# Patient Record
Sex: MALE | Race: WHITE | NOT HISPANIC OR LATINO | Employment: FULL TIME | URBAN - METROPOLITAN AREA
[De-identification: names, ages, dates, MRNs, and addresses within clinical notes are randomized per-mention and may not be internally consistent; named-entity substitution may affect disease eponyms.]

---

## 2020-09-16 ENCOUNTER — OFFICE VISIT - HEALTHEAST (OUTPATIENT)
Dept: FAMILY MEDICINE | Facility: CLINIC | Age: 35
End: 2020-09-16

## 2020-09-16 ENCOUNTER — COMMUNICATION - HEALTHEAST (OUTPATIENT)
Dept: TELEHEALTH | Facility: CLINIC | Age: 35
End: 2020-09-16

## 2020-09-16 DIAGNOSIS — K21.9 GASTROESOPHAGEAL REFLUX DISEASE WITHOUT ESOPHAGITIS: ICD-10-CM

## 2020-09-16 DIAGNOSIS — Z00.00 ENCOUNTER FOR GENERAL ADULT MEDICAL EXAMINATION WITHOUT ABNORMAL FINDINGS: ICD-10-CM

## 2020-09-16 DIAGNOSIS — E78.5 HYPERLIPIDEMIA, UNSPECIFIED HYPERLIPIDEMIA TYPE: ICD-10-CM

## 2020-09-16 DIAGNOSIS — B36.0 TINEA VERSICOLOR: ICD-10-CM

## 2020-09-16 ASSESSMENT — MIFFLIN-ST. JEOR: SCORE: 1806.49

## 2020-12-15 ENCOUNTER — AMBULATORY - HEALTHEAST (OUTPATIENT)
Dept: NURSING | Facility: CLINIC | Age: 35
End: 2020-12-15

## 2021-06-05 VITALS
SYSTOLIC BLOOD PRESSURE: 118 MMHG | HEIGHT: 71 IN | DIASTOLIC BLOOD PRESSURE: 78 MMHG | OXYGEN SATURATION: 98 % | TEMPERATURE: 97.9 F | WEIGHT: 189 LBS | HEART RATE: 66 BPM | BODY MASS INDEX: 26.46 KG/M2

## 2021-06-11 NOTE — PROGRESS NOTES
Assessment:      Healthy male exam.      1. Encounter for general adult medical examination without abnormal findings  Encouraged the patient on his weight and trying to eat healthier and find ways to exercise more.  At age 35 it is good to have a baseline set of lipids which were elevated 2 years ago but since he was very reluctant to get blood work done today I told him it was fine to wait another year or 2 but then certainly would want to recheck his lipids.  I did suggest to him that likely his lipids are going to be better now since he has made some positive changes.    2. Hyperlipidemia, unspecified hyperlipidemia type  2018: 215/140/37/178    3. Tinea versicolor  Treat with over-the-counter Lamisil daily for 2 to 4 weeks    4.  Gastroesophageal reflux disease  Continue with omeprazole 20 mg every morning    I did talk to him about the telangiectasia on his forehead and that certainly can be treated with electrocautery or laser but that the scar may be more noticeable than current telangiectasia.         Plan:       All questions answered.  Diagnosis explained in detail, including differential.  Discussed healthy lifestyle modifications.  The patient has a significant fear and anxiety about having blood draws and getting shots and states that 2 years ago when he had blood drawn he passed out.  Unless absolutely necessary would like to wait on doing blood work today.    Follow-up 1 year for preventative healthcare visit    Subjective:      Neville Neil is a 35 y.o. male who presents for an annual exam. The patient reports that there is not domestic violence in his life.      Healthy Habits:   Regular Exercise: No  Sunscreen Use: Yes  Healthy Diet: Yes  Dental Visits Regularly: Yes  Seat Belt: No  Sexually active: Yes  Monthly Self Testicular Exams:  No  Hemoccults: No  Flex Sig: No  Colonoscopy: No  Lipid Profile: Yes  Glucose Screen: Yes  Prevention of Osteoporosis: Yes  Last Dexa: No  Guns at Home:   No      Immunization History   Administered Date(s) Administered     INFLUENZA,SEASONAL QUAD, PF, =/> 6months 12/03/2019     Tdap 02/23/2010, 11/28/2017     Typhoid, Inj, Inactive 01/02/2008     Immunization status: up to date and documented, Refuses Immunization influenza vaccine.    Current Outpatient Medications   Medication Sig Dispense Refill     omeprazole (PRILOSEC) 20 MG capsule Take 20 mg by mouth.       No current facility-administered medications for this visit.      No past medical history on file.  No past surgical history on file.  Azithromycin  Family History   Problem Relation Age of Onset     Hyperlipidemia Mother      Hypertension Mother      Prostate cancer Father 74     Prostate cancer Paternal Grandfather      Social History     Socioeconomic History     Marital status:      Spouse name: Not on file     Number of children: Not on file     Years of education: Not on file     Highest education level: Not on file   Occupational History     Not on file   Social Needs     Financial resource strain: Not on file     Food insecurity     Worry: Not on file     Inability: Not on file     Transportation needs     Medical: Not on file     Non-medical: Not on file   Tobacco Use     Smoking status: Never Smoker     Smokeless tobacco: Never Used   Substance and Sexual Activity     Alcohol use: Yes     Alcohol/week: 1.0 standard drinks     Types: 1 Cans of beer per week     Drinks per session: 1 or 2     Drug use: Never     Sexual activity: Yes     Partners: Female   Lifestyle     Physical activity     Days per week: Not on file     Minutes per session: Not on file     Stress: Not on file   Relationships     Social connections     Talks on phone: Not on file     Gets together: Not on file     Attends Holiness service: Not on file     Active member of club or organization: Not on file     Attends meetings of clubs or organizations: Not on file     Relationship status: Not on file     Intimate partner  "violence     Fear of current or ex partner: Not on file     Emotionally abused: Not on file     Physically abused: Not on file     Forced sexual activity: Not on file   Other Topics Concern     Not on file   Social History Narrative    Works for [employer]          walking       Review of Systems  Review of Systems   Constitutional: Positive for activity change.        The patient has tried to be more active and eat smaller portions.   HENT: Positive for tinnitus.    Eyes: Negative.    Respiratory: Negative.    Cardiovascular: Negative.    Gastrointestinal: Negative.    Endocrine: Negative.    Genitourinary: Negative.    Musculoskeletal: Negative.    Skin: Positive for color change and rash.        A red spot is noted on the forehead which is 1 mm in diameter and in the left upper chest anteriorly there is a pigmented annular lesion that is asymptomatic and has been present for about 3 months.   Allergic/Immunologic: Negative.    Neurological: Negative.    Hematological: Negative.    Psychiatric/Behavioral: Negative.              Objective:     Vitals:    09/16/20 0840   BP: 118/78   Pulse: 66   Temp: 97.9  F (36.6  C)   SpO2: 98%   Weight: 189 lb (85.7 kg)   Height: 5' 10.5\" (1.791 m)     Body mass index is 26.74 kg/m .    Physical  Physical Exam  Vitals signs and nursing note reviewed.   Constitutional:       General: He is not in acute distress.     Appearance: Normal appearance. He is not ill-appearing.   HENT:      Head: Normocephalic and atraumatic.      Right Ear: Tympanic membrane, ear canal and external ear normal.      Left Ear: Tympanic membrane, ear canal and external ear normal.      Nose: Nose normal.      Mouth/Throat:      Mouth: Mucous membranes are moist.      Pharynx: Oropharynx is clear. No oropharyngeal exudate or posterior oropharyngeal erythema.   Eyes:      General: No scleral icterus.        Right eye: No discharge.         Left eye: No discharge.      Extraocular Movements: " Extraocular movements intact.      Conjunctiva/sclera: Conjunctivae normal.      Pupils: Pupils are equal, round, and reactive to light.   Neck:      Musculoskeletal: Normal range of motion. No muscular tenderness.      Vascular: No carotid bruit.   Cardiovascular:      Rate and Rhythm: Normal rate and regular rhythm.      Pulses: Normal pulses.      Heart sounds: Normal heart sounds. No murmur. No friction rub.   Pulmonary:      Effort: Pulmonary effort is normal.      Breath sounds: Normal breath sounds. No wheezing, rhonchi or rales.   Abdominal:      General: Bowel sounds are normal. There is no distension.      Palpations: Abdomen is soft. There is no mass.      Tenderness: There is no abdominal tenderness.      Hernia: No hernia is present.   Musculoskeletal: Normal range of motion.   Lymphadenopathy:      Cervical: No cervical adenopathy.   Skin:     Capillary Refill: Capillary refill takes 2 to 3 seconds.      Findings: Lesion and rash present.      Comments: There is a 1 mm telangiectasia type skin lesion on his forehead and a 6 mm annular salmon-colored rash on the left upper anterior chest with a mild scale.  Skin is otherwise normal   Neurological:      General: No focal deficit present.      Mental Status: He is alert.      Cranial Nerves: No cranial nerve deficit.      Sensory: No sensory deficit.      Motor: No weakness.      Coordination: Coordination normal.      Gait: Gait normal.   Psychiatric:         Mood and Affect: Mood normal.         Behavior: Behavior normal.         Thought Content: Thought content normal.         Judgment: Judgment normal.

## 2021-06-18 NOTE — PATIENT INSTRUCTIONS - HE
Patient Instructions by Harjinder Merrill MD at 9/16/2020  8:40 AM     Author: Harjinder Merrill MD Service: -- Author Type: Physician    Filed: 9/16/2020  9:11 AM Encounter Date: 9/16/2020 Status: Addendum    : Harjinder Merrill MD (Physician)    Related Notes: Original Note by Harjinder Merrill MD (Physician) filed at 9/16/2020  8:45 AM           Preventing Skin Cancer     Use sunscreen of SPF 30 or greater. Apply liberally.   Relaxing in the sun may feel good. But it isnt good for your skin. In fact, the suns harmful rays are the major cause of skin cancer. This is a serious disease that can be life-threatening. People of all ages, races, and backgrounds are at risk.  Skin cancer is the most common cancer in the [place] But in most cases, it can be prevented.  Your role in prevention  You can act today to help prevent skin cancer. Start by avoiding the suns UV (ultraviolet) rays. And dont use tanning beds or lamps. They are no safer than the sun. Taking these steps can help keep you from getting skin cancer. It can also help prevent wrinkles and other aging effects caused by the sun. Make sure your children also follow these safeguards. Now is the time to start taking steps to prevent skin cancer.  When you are outdoors  Protect your skin when you go out during the day. Take safety steps whenever you go out to eat, run errands by car or on foot, or do any outdoor activity. There isnt just one easy way to protect your skin. Its best to follow all of these steps:    Wear tightly woven clothing that covers your skin. Put on a wide-brimmed hat to protect your face, ears, and scalp.    Watch the clock. Try to stay out of the sun between 10 a.m. and 4 p.m. That's when the sun's rays are strongest.    Head for the shade or create your own. Use an umbrella when sitting or strolling.    Know that the suns rays can reflect off sand, water, and snow. This can harm your skin. Take extra care when you are near reflective  "surfaces.    Keep in mind that even when the weather is hazy or cloudy, your skin can be exposed to strong UV rays.    Shield your skin with sunscreen. Also use sunscreen on your childrens skin. Keep babies younger than 6 months old out of the sun.  Tips for using sunscreen  To help prevent skin cancer, choose the right sunscreen and use it correctly. Try these tips:    Choose a sunscreen that has an SPF (sun protection factor) of at least 30. Also choose a sunscreen labeled \"broad spectrum. This will protect you from both UVA and UVB (ultraviolet A and B) rays.    If one brand irritates your skin, try another, such as one without fragrance.    Use a water-resistant sunscreen if you swim or sweat.    Use at least 1 ounce of sunscreen to cover exposed areas. This is enough to fill a shot glass. You might need to adjust the amount depending on your body size.    Put the sunscreen on dry skin about 15 minutes before going outdoors. This gives it time to soak in.    Reapply sunscreen every 2 hours. If youre active, do this more often.    Cover any sun-exposed skin, from your face to your feet. Dont forget your scalp, ears, and lips.    Know that while sunscreen helps protect you, it isnt enough. Sunscreens extend the length of time you can be outdoors before your skin starts to get red. But they don't give you total protection. Using sunscreen doesn't mean you can stay out in the sun for an unlimited time. Your skin cells are still being damaged. You should also wear protective clothing. And try to stay out of the sun as much as you can, especially from 10 a.m. to 4 p.m.  Date Last Reviewed: 7/1/2019 2000-2019 [employer]. [address] [place] [address], [place] [address]. All rights reserved. This information is not intended as a substitute for professional medical care. Always follow your healthcare professional's instructions.      1. Use Lamisil cream daily for rash and use for 2 weeks       "

## 2021-09-26 ENCOUNTER — NURSE TRIAGE (OUTPATIENT)
Dept: NURSING | Facility: CLINIC | Age: 36
End: 2021-09-26

## 2021-09-26 NOTE — TELEPHONE ENCOUNTER
[place]. It has started to swell, nausea and dizziness, and a rash around the bite. Initially after the bite there was a dime size raised area.   Patient was pretty dizzy and pale right away and for about 30 minutes afterward. Sat down and felt a little better but still nauseous. Patient needed assistance to get to the car. Patient is icing now. There is tingling at the sight. The bite is on the bicep area. The rash and redness is about a half dollar size. The spider was small and black about half the size of a dime. It is now an hour after the bite, patient is home, icing, there is about an 2 inch radius rash around the site.   Call to PCP on call for second level triage Dr. Miller at 12:28 pm.   For the next 48 hour that the rash doesn't spread or change. Icing for inflammation, hydrocortisone cream. Wash and clean the site.   Follow up with primary.  Return call to patient. Relayed information from on call provider. Informed patient that he should get a tetanus booster within 3 days. Care advice given and connected to scheduling line.   Patient will call back with any worsening symptoms. Patient education sent via flikdate.   Michelle Devlin RN   09/26/21 12:41 PM  [facility] Nurse Advisor  COVID 19 Nurse Triage Plan/Patient Instructions    Please be aware that novel coronavirus (COVID-19) may be circulating in the community. If you develop symptoms such as fever, cough, or SOB or if you have concerns about the presence of another infection including coronavirus (COVID-19), please contact your health care provider or visit [email or web address].     Disposition/Instructions    In-Person Visit with provider recommended. Reference Visit Selection Guide.    Thank you for taking steps to prevent the spread of this virus.  o Limit your contact with others.  o Wear a simple mask to cover your cough.  o Wash your hands well and often.    Resources    [facility]  [place]: About COVID-19: [email or web address]    CDC: What to Do If You're Sick: www.cdc.gov/coronavirus/2019-ncov/about/steps-when-sick.html    CDC: Ending Home Isolation: www.cdc.gov/coronavirus/2019-ncov/hcp/disposition-in-home-patients.html     CDC: Caring for Someone: www.cdc.gov/coronavirus/2019-ncov/if-you-are-sick/care-for-someone.html     [facility]: Interim Guidance for Hospital Discharge to Home: [email or web address]/hcp/hospdischarge.pdf    [facility] clinical trials (COVID-19 research studies): [email or web address].[facility]-clinical-trials     Below are the COVID-19 hotlines at the [place] [facility] ([facility]). Interpreters are available.   o For health questions: Call 341-007-4381 or 1-749.645.5440 (7 a.m. to 7 p.m.)  o For questions about schools and childcare: Call 130-034-4967 or 1-871.527.8965 (7 a.m. to 7 p.m.)     Reason for Disposition    Last tetanus shot > 10 years ago    Additional Information    Negative: Difficulty breathing or swallowing    Negative: Shock suspected (e.g., cold/pale/clammy skin, too weak to stand, low BP, rapid pulse)    Negative: Difficult to awaken or acting confused (e.g., disoriented, slurred speech)    Negative: Sounds like a life-threatening emergency to the triager    Negative: Boil suspected (i.e., painful red lump and NO spider bite)    Negative: Not a spider bite    Negative: [1] Black  (or brown ) spider bite AND [2] local skin changes    Negative: Abdominal pain, chest tightness or other muscle cramps    Negative: Urine is brown, black or red in color    Negative: Vomiting    Negative: [1] Rash elsewhere on body AND [2] developed after spider bite    Negative: Patient sounds very sick or weak to the triager    Negative: [1] SEVERE bite pain AND [2] not improved after 2 hours of pain medicine    Negative: [1] Fever AND [2] red area    Negative: [1] Fever AND [2] area is very tender to touch    Negative:  [1] Red streak or red line AND [2] length > 2 inches (5 cm)    Negative: [1] Red or very tender (to touch) area AND [2] started over 24 hours after the bite    Negative: [1] Red or very tender (to touch) area AND [2] getting larger over 48 hours after the bite    Negative: Eye irritation after handling or touching a tarantula    Negative: No prior tetanus shots (or is not fully vaccinated)    Negative: Bite starts to look bad (e.g., blister, purplish skin, ulcer) (Exception:  just swelling or small red bump)    Negative: [1] Scab is present AND [2] it drains pus or increases in size AND [3] not improved after applying  antibiotic ointment for 2 days    Negative: [1] Has diabetes (diabetes mellitus) AND [2] spider bite wound on foot    Protocols used: SPIDER BITE [facility]

## 2021-09-26 NOTE — PATIENT INSTRUCTIONS
Patient Education     Nonpoisonous Spider Bite     The venom from a spider bite can cause a local skin reaction. This often causes local redness, itching, and swelling. This reaction will fade over a few hours to a few days. A spider bite can become infected, so watch for the signs listed below. Sometimes it's hard to tell the difference between a local reaction to the insect bite or sting and an early infection. So your healthcare provider may start you on antibiotics.  Home care  The following guidelines will help you care for your wound at home:    Stay away from anything that heats up your skin if itching is a problem. This includes hot showers or baths or direct sunlight. This will make the itching worse.    During the first 24 hours, you may put an ice pack on the injury. Use it for no more than 20 minutes at a time every 1 to 2 hours. This will reduce pain and swelling. It will also help with itching. You can make an ice pack by putting ice cubes in a plastic bag that seals at the top. Wrap the bag in a thin, clean towel. Don't put ice or an ice pack directly on the skin. You may also use an over-the-counter spray or cream containing benzocaine to help ease pain. Over-the-counter skin creams containing diphenhydramine or hydrocortisone may help with itching. Remember to review the medicine instructions for any allergies.    If the wound becomes red, wash the area with soap and water every day.  Put an antibiotic cream or ointment on the injury 3 times a day as directed.    If your healthcare provider has prescribed oral antibiotics, take them as directed until they are all finished.  Follow-up care  Follow up with your healthcare provider, or as advised.  When to get medical advice  Call your healthcare provider right away if any of these occur:    Spreading areas of itching, redness, or swelling    Pain or swelling that gets worse    Fever of 100.4 F (38 C) or higher, or as directed by your healthcare  provider    Drainage from the wound    You get a skin sore (skin ulcer)    A red streak in the skin leading away from the wound    You still have symptoms after 3 days    Generalized rash, fever, or joint pain starting 1 to 2 weeks after treatment  Call 911  Call 911 if any of these occur:    New or worse swelling in the face, eyelids, lips, mouth, throat, or tongue    Hard time swallowing or breathing    Dizziness, weakness, or fainting  Liu last reviewed this educational content on 11/1/2019 2000-2021 [employer]. All rights reserved. This information is not intended as a substitute for professional medical care. Always follow your healthcare professional's instructions.

## 2021-09-30 ENCOUNTER — OFFICE VISIT (OUTPATIENT)
Dept: FAMILY MEDICINE | Facility: CLINIC | Age: 36
End: 2021-09-30
Payer: COMMERCIAL

## 2021-09-30 VITALS
HEART RATE: 80 BPM | DIASTOLIC BLOOD PRESSURE: 70 MMHG | WEIGHT: 192.8 LBS | HEIGHT: 71 IN | OXYGEN SATURATION: 99 % | SYSTOLIC BLOOD PRESSURE: 112 MMHG | BODY MASS INDEX: 26.99 KG/M2

## 2021-09-30 DIAGNOSIS — J30.2 SEASONAL ALLERGIC RHINITIS, UNSPECIFIED TRIGGER: ICD-10-CM

## 2021-09-30 DIAGNOSIS — E78.5 HYPERLIPIDEMIA WITH TARGET LDL LESS THAN 130: ICD-10-CM

## 2021-09-30 DIAGNOSIS — D22.9 NEVUS SEBACEOUS: ICD-10-CM

## 2021-09-30 DIAGNOSIS — Z00.00 ROUTINE GENERAL MEDICAL EXAMINATION AT A HEALTH CARE FACILITY: Primary | ICD-10-CM

## 2021-09-30 DIAGNOSIS — L82.1 SEBORRHEIC KERATOSES: ICD-10-CM

## 2021-09-30 DIAGNOSIS — R15.2 FECAL URGENCY: ICD-10-CM

## 2021-09-30 DIAGNOSIS — K21.9 GASTROESOPHAGEAL REFLUX DISEASE WITHOUT ESOPHAGITIS: ICD-10-CM

## 2021-09-30 PROCEDURE — 99395 PREV VISIT EST AGE 18-39: CPT | Performed by: FAMILY MEDICINE

## 2021-09-30 ASSESSMENT — MIFFLIN-ST. JEOR: SCORE: 1826.67

## 2021-09-30 NOTE — PROGRESS NOTES
SUBJECTIVE:   CC: Neville Neil is an 36 year old male who presents for preventative health visit.       Patient has been advised of split billing requirements and indicates understanding: Yes  Healthy Habits:     Getting at least 3 servings of Calcium per day:  Yes    Bi-annual eye exam:  NO    Dental care twice a year:  Yes    Sleep apnea or symptoms of sleep apnea:  None    Diet:  Other    Frequency of exercise:  2-3 days/week    Duration of exercise:  15-30 minutes    Taking medications regularly:  No    Medication side effects:  Not applicable    PHQ-2 Total Score: 0    Additional concerns today:  No      Today's PHQ-2 Score:   PHQ-2 ( 1999 [employer]) 9/30/2021   Q1: Little interest or pleasure in doing things 0   Q2: Feeling down, depressed or hopeless 0   PHQ-2 Score 0   Q1: Little interest or pleasure in doing things Not at all   Q2: Feeling down, depressed or hopeless Not at all   PHQ-2 Score 0       Abuse: Current or Past(Physical, Sexual or Emotional)- No  Do you feel safe in your environment? Yes    Have you ever done Advance Care Planning? (For example, a Health Directive, POLST, or a discussion with a medical provider or your loved ones about your wishes): No, advance care planning information given to patient to review.  Patient declined advance care planning discussion at this time.    Social History     Tobacco Use     Smoking status: Never Smoker     Smokeless tobacco: Never Used   Substance Use Topics     Alcohol use: Yes     Alcohol/week: 1.0 standard drinks     If you drink alcohol do you typically have >3 drinks per day or >7 drinks per week? No    Alcohol Use 9/30/2021   Prescreen: >3 drinks/day or >7 drinks/week? Not Applicable   No flowsheet data found.    Last PSA: No results found for: PSA    Reviewed orders with patient. Reviewed health maintenance and updated orders accordingly - Yes  No lab work at this time partially because of the patient's preference and experience of vasovagal  syncope related to blood draws    Reviewed and updated as needed this visit by clinical staff   Allergies               Reviewed and updated as needed this visit by Provider                No past medical history on file.   No past surgical history on file.    Review of Systems  CONSTITUTIONAL: NEGATIVE for fever, chills, change in weight  INTEGUMENTARY/SKIN: The patient has 3 skin lesions that he like for me to look at.  One is in the pubic area and the other 2 are on the trunk  EYES: NEGATIVE for vision changes or irritation  ENT: Seasonal congestion and frequent sniffling.  Would like to see an allergist.  RESP: NEGATIVE for significant cough or SOB  CV: NEGATIVE for chest pain, palpitations or peripheral edema  GI: History of more frequent stools 2-3 times a day often related to eating with some fecal urgency.  Would not call stools diarrhea but they are softer than usual.  No blood in the stools.  No family history of celiac disease.  And heartburn or reflux, related to red meat and alcohol   male: negative for dysuria, hematuria, decreased urinary stream, erectile dysfunction, urethral discharge  MUSCULOSKELETAL: NEGATIVE for significant arthralgias or myalgia  NEURO: NEGATIVE for weakness, dizziness or paresthesias  PSYCHIATRIC: NEGATIVE for changes in mood or affect    OBJECTIVE:   There were no vitals taken for this visit.    Physical Exam  GENERAL: healthy, alert and no distress  EYES: Eyes grossly normal to inspection, PERRL and conjunctivae and sclerae normal  HENT: ear canals and TM's normal, nose and mouth without ulcers or lesions  NECK: no adenopathy, no asymmetry, masses, or scars and thyroid normal to palpation  RESP: lungs clear to auscultation - no rales, rhonchi or wheezes  CV: regular rate and rhythm, normal S1 S2, no S3 or S4, no murmur, click or rub, no peripheral edema and peripheral pulses strong  ABDOMEN: soft, nontender, no hepatosplenomegaly, no masses and bowel sounds normal  MS: no  gross musculoskeletal defects noted, no edema  SKIN: no suspicious lesions or rashes  SKIN: no suspicious lesions or rashes and pubic area midline 8 mm nevus sebaceous that appears benign, left shoulder 3 mm seborrheic keratosis and right lateral chest 3 mm seborrheic keratosis  NEURO: Normal strength and tone, mentation intact and speech normal  PSYCH: mentation appears normal, affect normal/bright    Diagnostic Test Results:  Labs reviewed in Epic  none     ASSESSMENT/PLAN:   Neville was seen today for physical.    Diagnoses and all orders for this visit:    Routine general medical examination at a health care facility  -     REVIEW OF HEALTH MAINTENANCE PROTOCOL ORDERS  The patient plans to get the influenza vaccine at a later time with his family  No blood work today primarily because its not absolutely indicated or needed but also because he has a lot of phobia about needles and has a tendency to faint.  But prefer to have it done in a setting where he can lie down and has had the opportunity to be well hydrated.    Hyperlipidemia with target LDL less than 130  Last set of lipids 2018: 215/140/37/178    Gastroesophageal reflux disease without esophagitis  Patient will try to avoid alcohol and red meat which seems to contribute to his esophagitis symptoms.  Uses famotidine as needed with benefit and has no dysphagia    Seborrheic keratoses  Reassurance about the seborrheic keratosis on his left shoulder and right lateral chest.  Discussed with him how they could be treated if he does not want them to still be there but there is no absolute reason why he has to have those removed.    Nevus sebaceous pubic area  Feel this is benign and if he wants to have that removed would do that as a saucer type biopsy with a derma blade or an excision with a scalpel.  Would send it in for path report if that is done.    Seasonal allergic rhinitis, unspecified trigger  Would be reasonable for the patient to see allergy  "specialist for skin testing    Fecal urgency  This likely is IBS but other possibilities are certainly an option.  The patient would like to see a GI specialist and he already has a relationship with [facility] and will call to make that appointment.  If he needs a referral for me he will let me know.  He has noticed that his bowel movements have gone from 1 a day to 2-3/day and they are often associated with eating or shortly after eating.  I did explain to him the normal reflux that occurs in many people with eating.      Patient has been advised of split billing requirements and indicates understanding: Yes  COUNSELING:   Reviewed preventive health counseling, as reflected in patient instructions       Regular exercise       Healthy diet/nutrition       Patient plans to arrange for consultation with GI specialist at [facility] where he is gone before and also to an allergist.  If help needed for that he is to let me know so I can put in referral    Estimated body mass index is 26.74 kg/m  as calculated from the following:    Height as of 9/16/20: 1.791 m (5' 10.5\").    Weight as of 9/16/20: 85.7 kg (189 lb).     Weight management plan: Discussed healthy diet and exercise guidelines    He reports that he has never smoked. He has never used smokeless tobacco.    Billing preventive healthcare visit without split billing  Follow-up 1 year         Harjinder Merrill MD  [facility]  "

## 2021-10-02 PROBLEM — E78.5 HYPERLIPIDEMIA WITH TARGET LDL LESS THAN 130: Status: ACTIVE | Noted: 2021-10-02

## 2021-10-16 ENCOUNTER — HEALTH MAINTENANCE LETTER (OUTPATIENT)
Age: 36
End: 2021-10-16

## 2021-12-07 ENCOUNTER — TRANSFERRED RECORDS (OUTPATIENT)
Dept: HEALTH INFORMATION MANAGEMENT | Facility: CLINIC | Age: 36
End: 2021-12-07
Payer: COMMERCIAL

## 2021-12-09 ENCOUNTER — ALLIED HEALTH/NURSE VISIT (OUTPATIENT)
Dept: FAMILY MEDICINE | Facility: CLINIC | Age: 36
End: 2021-12-09
Payer: COMMERCIAL

## 2021-12-09 DIAGNOSIS — Z23 NEED FOR INFLUENZA VACCINATION: Primary | ICD-10-CM

## 2021-12-09 PROCEDURE — 90682 RIV4 VACC RECOMBINANT DNA IM: CPT

## 2021-12-09 PROCEDURE — 90471 IMMUNIZATION ADMIN: CPT

## 2021-12-09 PROCEDURE — 99207 PR NO CHARGE NURSE ONLY: CPT

## 2022-01-04 ENCOUNTER — TRANSFERRED RECORDS (OUTPATIENT)
Dept: HEALTH INFORMATION MANAGEMENT | Facility: CLINIC | Age: 37
End: 2022-01-04
Payer: COMMERCIAL

## 2022-02-02 ENCOUNTER — HOSPITAL ENCOUNTER (EMERGENCY)
Facility: CLINIC | Age: 37
Discharge: HOME | End: 2022-02-02
Attending: STUDENT IN AN ORGANIZED HEALTH CARE EDUCATION/TRAINING PROGRAM | Admitting: STUDENT IN AN ORGANIZED HEALTH CARE EDUCATION/TRAINING PROGRAM
Payer: COMMERCIAL

## 2022-02-02 ENCOUNTER — APPOINTMENT (OUTPATIENT)
Dept: RADIOLOGY | Facility: CLINIC | Age: 37
End: 2022-02-02
Attending: STUDENT IN AN ORGANIZED HEALTH CARE EDUCATION/TRAINING PROGRAM
Payer: COMMERCIAL

## 2022-02-02 VITALS
DIASTOLIC BLOOD PRESSURE: 70 MMHG | RESPIRATION RATE: 31 BRPM | HEART RATE: 80 BPM | WEIGHT: 185 LBS | OXYGEN SATURATION: 98 % | SYSTOLIC BLOOD PRESSURE: 115 MMHG | BODY MASS INDEX: 25.9 KG/M2 | HEIGHT: 71 IN

## 2022-02-02 DIAGNOSIS — R07.9 CHEST PAIN, UNSPECIFIED TYPE: ICD-10-CM

## 2022-02-02 LAB
ALBUMIN SERPL-MCNC: 4.1 G/DL (ref 3.5–5)
ALP SERPL-CCNC: 78 U/L (ref 45–120)
ALT SERPL W P-5'-P-CCNC: 17 U/L (ref 0–45)
ANION GAP SERPL CALCULATED.3IONS-SCNC: 10 MMOL/L (ref 5–18)
AST SERPL W P-5'-P-CCNC: 17 U/L (ref 0–40)
ATRIAL RATE - MUSE: 79 BPM
BASOPHILS # BLD AUTO: 0 10E3/UL (ref 0–0.2)
BASOPHILS NFR BLD AUTO: 1 %
BILIRUB SERPL-MCNC: 0.4 MG/DL (ref 0–1)
BUN SERPL-MCNC: 8 MG/DL (ref 8–22)
CALCIUM SERPL-MCNC: 9.3 MG/DL (ref 8.5–10.5)
CHLORIDE BLD-SCNC: 105 MMOL/L (ref 98–107)
CO2 SERPL-SCNC: 28 MMOL/L (ref 22–31)
CREAT SERPL-MCNC: 1.02 MG/DL (ref 0.7–1.3)
D DIMER PPP FEU-MCNC: <=0.27 UG/ML FEU (ref 0–0.5)
DIASTOLIC BLOOD PRESSURE - MUSE: NORMAL MMHG
EOSINOPHIL # BLD AUTO: 0.1 10E3/UL (ref 0–0.7)
EOSINOPHIL NFR BLD AUTO: 2 %
ERYTHROCYTE [DISTWIDTH] IN BLOOD BY AUTOMATED COUNT: 12.5 % (ref 10–15)
GFR SERPL CREATININE-BSD FRML MDRD: >90 ML/MIN/1.73M2
GLUCOSE BLD-MCNC: 100 MG/DL (ref 70–125)
HCT VFR BLD AUTO: 48.9 % (ref 40–53)
HGB BLD-MCNC: 16.1 G/DL (ref 13.3–17.7)
HOLD SPECIMEN: NORMAL
IMM GRANULOCYTES # BLD: 0 10E3/UL
IMM GRANULOCYTES NFR BLD: 0 %
INTERPRETATION ECG - MUSE: NORMAL
LYMPHOCYTES # BLD AUTO: 2 10E3/UL (ref 0.8–5.3)
LYMPHOCYTES NFR BLD AUTO: 36 %
MCH RBC QN AUTO: 29.2 PG (ref 26.5–33)
MCHC RBC AUTO-ENTMCNC: 32.9 G/DL (ref 31.5–36.5)
MCV RBC AUTO: 89 FL (ref 78–100)
MONOCYTES # BLD AUTO: 0.5 10E3/UL (ref 0–1.3)
MONOCYTES NFR BLD AUTO: 10 %
NEUTROPHILS # BLD AUTO: 2.9 10E3/UL (ref 1.6–8.3)
NEUTROPHILS NFR BLD AUTO: 51 %
NRBC # BLD AUTO: 0 10E3/UL
NRBC BLD AUTO-RTO: 0 /100
P AXIS - MUSE: 47 DEGREES
PLATELET # BLD AUTO: 293 10E3/UL (ref 150–450)
POTASSIUM BLD-SCNC: 3.7 MMOL/L (ref 3.5–5)
PR INTERVAL - MUSE: 128 MS
PROT SERPL-MCNC: 7.6 G/DL (ref 6–8)
QRS DURATION - MUSE: 98 MS
QT - MUSE: 384 MS
QTC - MUSE: 440 MS
R AXIS - MUSE: 66 DEGREES
RBC # BLD AUTO: 5.51 10E6/UL (ref 4.4–5.9)
SODIUM SERPL-SCNC: 143 MMOL/L (ref 136–145)
SYSTOLIC BLOOD PRESSURE - MUSE: NORMAL MMHG
T AXIS - MUSE: 35 DEGREES
TROPONIN I SERPL-MCNC: <0.01 NG/ML (ref 0–0.29)
TROPONIN I SERPL-MCNC: <0.01 NG/ML (ref 0–0.29)
VENTRICULAR RATE- MUSE: 79 BPM
WBC # BLD AUTO: 5.6 10E3/UL (ref 4–11)

## 2022-02-02 PROCEDURE — 85379 FIBRIN DEGRADATION QUANT: CPT | Performed by: STUDENT IN AN ORGANIZED HEALTH CARE EDUCATION/TRAINING PROGRAM

## 2022-02-02 PROCEDURE — 93005 ELECTROCARDIOGRAM TRACING: CPT | Performed by: EMERGENCY MEDICINE

## 2022-02-02 PROCEDURE — 85025 COMPLETE CBC W/AUTO DIFF WBC: CPT | Performed by: STUDENT IN AN ORGANIZED HEALTH CARE EDUCATION/TRAINING PROGRAM

## 2022-02-02 PROCEDURE — 84484 ASSAY OF TROPONIN QUANT: CPT | Performed by: STUDENT IN AN ORGANIZED HEALTH CARE EDUCATION/TRAINING PROGRAM

## 2022-02-02 PROCEDURE — 80053 COMPREHEN METABOLIC PANEL: CPT | Performed by: STUDENT IN AN ORGANIZED HEALTH CARE EDUCATION/TRAINING PROGRAM

## 2022-02-02 PROCEDURE — 36415 COLL VENOUS BLD VENIPUNCTURE: CPT | Performed by: STUDENT IN AN ORGANIZED HEALTH CARE EDUCATION/TRAINING PROGRAM

## 2022-02-02 PROCEDURE — 71046 X-RAY EXAM CHEST 2 VIEWS: CPT

## 2022-02-02 PROCEDURE — 99285 EMERGENCY DEPT VISIT HI MDM: CPT | Mod: 25

## 2022-02-02 ASSESSMENT — MIFFLIN-ST. JEOR: SCORE: 1791.28

## 2022-02-02 NOTE — ED TRIAGE NOTES
The patient presents to the ED with c/o left-sided chest pain that started about 45 minutes prior to arrival. Patient reports he found out a few hours ago that his grandmother has passed. Denies any previous chest pain or medical history. Describes as an ache and radiates into his left shoulder.

## 2022-02-02 NOTE — ED NOTES
Patient reported a 5 hour flight last week. He does have left chest pain that radiates to his left shoulder. Lungs are clear. NSR noted. He did just have his family member die and had the pressure after that.

## 2022-02-02 NOTE — ED PROVIDER NOTES
Emergency Department Encounter         FINAL IMPRESSION:  Chest pain        ED COURSE AND MEDICAL DECISION MAKING       ED Course as of 02/02/22 1328   Wed Feb 02, 2022   1142 Patient is a healthy 36-year-old no chronic medical problems here with left-sided chest pain.  Patient the chest pain began approximate 1 hour ago when he found out about news of his grandma passing away.  Recently got back from the [place] last week which was a 5-1/2-hour flight.  Never had chest pain like this before.  No hemoptysis.  No leg swelling or shortness of breath.  No fevers.  No abdominal pain.  Pain does not radiate into his back or down to his abdomen.  On arrival he is tearful.  Otherwise vitals are stable.  Heart and lungs are normal.  No reproducible chest pain.  No rash or trauma appreciated to the chest wall.  Abdomen is benign.  Heart score of 0   -Troponin x2 -.  Chest x-ray clear.  Labs otherwise reassuring.  Patient states that he is feeling better.  Unsure what is causing his pain.  No signs of pericarditis or myocarditis.  Dimer negative.  No pneumothorax.    11:36 AM I met with the patient, obtained history, performed an initial exam, and discussed options and plan for diagnostics and treatment here in the ED. PPE worn including surgical mask, surgical gloves.  1:28 PM I rechecked and updated the patient on lab and imaging results.      EKG is sinus 79 , no signs acute ischemia, no inversions no depressions no STEMI criteria, no signs of malignant arrhythmias      At the conclusion of the encounter I discussed the results of all the tests and the disposition. The questions were answered. The patient or family acknowledged understanding and was agreeable with the care plan.          MEDICATIONS GIVEN IN THE EMERGENCY DEPARTMENT:  Medications - No data to display    NEW PRESCRIPTIONS STARTED AT TODAY'S ED VISIT:  New Prescriptions    No medications on file       HPI     Patient information obtained  from: Patient    Use of Interpretor: N/A    Neville Neil is a 36 year old male with no recorded pertinent medical history who presents to this ED by walk in for evaluation of chest pain. Patient reports developing left sided chest pain with radiation into his left shoulder area 1 hour prior to ED arrival. He describes his pain as achy. He states his pain is made worse with straining his body such as when he urinates. Denies pain with deep inspiration. He states he was in the [place] last week and his flight back was 5.5 hours. Patient found out about the passing of his grandmother a couple of hours ago, prior to his chest pain onset.    Denies any additional pertinent medical history, daily prescription medications, surgical history. He endorses an allergy to Azithromycin. Denies bladder or bowel changes. No other reported concerns at this time.    SHx- Denies tobacco, alcohol use.        REVIEW OF SYSTEMS:  Review of Systems   Constitutional: Negative for fever, malaise  HEENT: Negative runny nose, sore throat, ear pain, neck pain  Respiratory: Negative for shortness of breath, cough, congestion  Cardiovascular: Positive for chest pain (achy). Negative for leg edema  Gastrointestinal: Negative for abdominal distention, abdominal pain, constipation, vomiting, nausea, diarrhea  Genitourinary: Negative for dysuria and hematuria.   Integument: Negative for rash, skin breakdown  Neurological: Negative for paresthesias, weakness, headache.  Musculoskeletal: Negative for joint pain, joint swelling  Psychological: Positive for grieving.      All other systems reviewed and are negative.          MEDICAL HISTORY     History reviewed. No pertinent past medical history.    History reviewed. No pertinent surgical history.    Social History     Tobacco Use     Smoking status: Never Smoker     Smokeless tobacco: Never Used   Substance Use Topics     Alcohol use: Yes     Alcohol/week: 1.0 standard drink     Drug  "use: Never       Famotidine (PEPCID PO)            PHYSICAL EXAM     /68   Pulse 76   Resp 23   Ht 1.803 m (5' 11\")   Wt 83.9 kg (185 lb)   SpO2 97%   BMI 25.80 kg/m        PHYSICAL EXAM:     General: Patient appears well, nontoxic, Tearful  HEENT: Moist mucous membranes, no tongue swelling.  No head trauma.  No midline neck pain.  Cardiovascular: Normal rate, normal rhythm, no extremity edema.  No appreciable murmur.  Respiratory: No signs of respiratory distress, lungs are clear to auscultation bilaterally with no wheezes rhonchi or rales.  Abdominal: Soft, nontender, nondistended, no palpable masses, no guarding, no rebound  Musculoskeletal: Full range of motion of joints, no deformities appreciated.  Neurological: Alert and oriented, grossly neurologically intact.  Psychological: Normal affect and mood.  Integument: No rashes appreciated  Psychological: Tearful appearing          RESULTS       Labs Ordered and Resulted from Time of ED Arrival to Time of ED Departure   COMPREHENSIVE METABOLIC PANEL - Normal       Result Value    Sodium 143      Potassium 3.7      Chloride 105      Carbon Dioxide (CO2) 28      Anion Gap 10      Urea Nitrogen 8      Creatinine 1.02      Calcium 9.3      Glucose 100      Alkaline Phosphatase 78      AST 17      ALT 17      Protein Total 7.6      Albumin 4.1      Bilirubin Total 0.4      GFR Estimate >90     TROPONIN I - Normal    Troponin I <0.01     D DIMER QUANTITATIVE - Normal    D-Dimer Quantitative <=0.27     CBC WITH PLATELETS AND DIFFERENTIAL    WBC Count 5.6      RBC Count 5.51      Hemoglobin 16.1      Hematocrit 48.9      MCV 89      MCH 29.2      MCHC 32.9      RDW 12.5      Platelet Count 293      % Neutrophils 51      % Lymphocytes 36      % Monocytes 10      % Eosinophils 2      % Basophils 1      % Immature Granulocytes 0      NRBCs per 100 WBC 0      Absolute Neutrophils 2.9      Absolute Lymphocytes 2.0      Absolute Monocytes 0.5      Absolute " Eosinophils 0.1      Absolute Basophils 0.0      Absolute Immature Granulocytes 0.0      Absolute NRBCs 0.0     TROPONIN I       Chest XR,  PA & LAT   Final Result   IMPRESSION: Negative chest. Lungs are clear. No obvious fracture.              PROCEDURES:  Procedures:  Procedures       I, Kareem Florentino am serving as a scribe to document services personally performed by Salas Chavarria DO, based on my observations and the provider's statements to me.  I, Salas Chavarria DO, attest that Kareem Florentino is acting in a scribe capacity, has observed my performance of the services and has documented them in accordance with my direction.    Salas Chavarria DO  Emergency Medicine  [facility] EMERGENCY ROOM     OhSalas alicea DO  02/02/22 [id number]

## 2022-02-04 ENCOUNTER — OFFICE VISIT (OUTPATIENT)
Dept: CARDIOLOGY | Facility: CLINIC | Age: 37
End: 2022-02-04
Attending: STUDENT IN AN ORGANIZED HEALTH CARE EDUCATION/TRAINING PROGRAM
Payer: COMMERCIAL

## 2022-02-04 VITALS
WEIGHT: 191 LBS | RESPIRATION RATE: 16 BRPM | DIASTOLIC BLOOD PRESSURE: 70 MMHG | SYSTOLIC BLOOD PRESSURE: 94 MMHG | HEART RATE: 70 BPM | HEIGHT: 71 IN | BODY MASS INDEX: 26.74 KG/M2

## 2022-02-04 DIAGNOSIS — R07.9 CHEST PAIN, UNSPECIFIED TYPE: ICD-10-CM

## 2022-02-04 PROCEDURE — 99204 OFFICE O/P NEW MOD 45 MIN: CPT | Performed by: INTERNAL MEDICINE

## 2022-02-04 RX ORDER — CALCIUM POLYCARBOPHIL 625 MG
1 TABLET ORAL DAILY PRN
COMMUNITY
End: 2024-03-22

## 2022-02-04 ASSESSMENT — MIFFLIN-ST. JEOR: SCORE: 1818.5

## 2022-02-04 NOTE — LETTER
2/4/2022    Harjinder Merrill MD  [address], [address]  [place] [address]    RE: Neville Neil       Dear Colleague,     I had the pleasure of seeing Neville Neil in the [facility].      Thank you, Harjinder Peterson, for asking the [facility] team to see Mr. Neville Neil to evaluate Consult    Assessment/Recommendations   Assessment:    1. Chest pain - may be related to a mild form of stress-cardiomyopathy or an acute grief response. May also be from a musculoskeletal shoulder injury or muscle/ligament strain.     Plan:  1. Echocardiogram to assess LV function.   2. Advised Mr. Neil to limit exercise to mild/moderate intensity until echo results are available. If any reduction in LV Function would need to limit exercise for 4-6 weeks. If LV function normal he could resume exercise as tolerated.         History of Present Illness   Mr. Neville Neil is a 36 year old healthy male who presents for evaluation of chest pain.      heard news of the death of his grandmother and developed acute onset chest pain. The pain was located in his left chest and left shoulder. Not associated with dyspnea, palpitations, or sweating. Was somewhat worse with activity but nor could he get comfortable at rest. After 45 minutes of pain he presented to the ER where he had an overall negative evaluation including negative troponin x2, non-ischemic ECG, normal chest x-ray, and eventual resolution of his pain. He did have some mild discomfort yesterday. He is generally healthy and active. He is exercising regularly for the past month on a treadmill, stationary bicycle and lifting weights. Prior to onset of pain 2 days ago he has had no exertional symptoms or limitations. He does not use tobacco or nicotine, does not use drugs, and has no family history of heart disease.    Other than noted above, Mr. Neil denies any chest  "pain/pressure/tightness, shortness of breath at rest or with exertion, light headedness/dizziness, pre-syncope, syncope, lower extremity swelling, palpitations, paroxysmal nocturnal dyspnea (PND), or orthopnea.     Cardiac Problems and Cardiac Diagnostics     Most Recent Cardiac testing:  ECG dated 2/2/22 (personaly reviewed and interpreted): normal sinus rhythm. Normal ECG.           Medications  Allergies   Current Outpatient Medications   Medication Sig Dispense Refill     Calcium Polycarbophil (FIBER) 625 MG tablet Take 1 tablet by mouth daily as needed for constipation       MULTIPLE VITAMIN PO Take 1 tablet by mouth daily       Famotidine (PEPCID PO) Take 20 tablets by mouth daily as needed for indigestion (Patient not taking: Reported on 2/4/2022)        Allergies   Allergen Reactions     Azithromycin Other (See Comments)     PN: Uncontrolled shaking with higher dose(1st day), PN: Uncontrolled shaking with higher dose(1st day)        Physical Examination Review of Systems   Vitals: BP 94/70 (BP Location: Left arm, Patient Position: Sitting, Cuff Size: Adult Large)   Pulse 70   Resp 16   Ht 1.803 m (5' 11\")   Wt 86.6 kg (191 lb)   BMI 26.64 kg/m    BMI= Body mass index is 26.64 kg/m .  Wt Readings from Last 3 Encounters:   02/04/22 86.6 kg (191 lb)   02/02/22 83.9 kg (185 lb)   09/30/21 87.5 kg (192 lb 12.8 oz)       General Appearance:   Pleasant male, appears stated age. no acute distress, normal body habitus   ENT/Mouth: membranes moist, no apparent gingival bleeding.      EYES:  no scleral icterus, normal conjunctivae   Neck: no carotid bruits. supple   Respiratory:   lungs are clear to auscultation, no rales or wheezing, equal chest wall expansion    Cardiovascular:   Regular rhythm, normal rate. Normal first and second heart sounds with no murmurs, rubs, or gallops; radial pulses 2+ bilaterally. Jugular venous pressure normal, no edema bilaterally    Abdomen/GI:  Soft, non-tender   Extremities: no " cyanosis or clubbing   Skin: no xanthelasma, warm.    Heme/lymph/ Immunology No apparent bleeding noted.   Neurologic: Alert and oriented. normal gait, no tremors   Psychiatric: Pleasant, calm, appropriate affect.         Please refer above for cardiac ROS details.       Past History   Past Medical History: History reviewed. No pertinent past medical history.     Past Surgical History: History reviewed. No pertinent surgical history.     Family History:   Family History   Problem Relation Age of Onset     Hyperlipidemia Mother      Hypertension Mother      Prostate Cancer Father 74.00     Prostate Cancer Paternal Grandfather         Social History:   Social History     Socioeconomic History     Marital status:      Spouse name: Not on file     Number of children: Not on file     Years of education: Not on file     Highest education level: Not on file   Occupational History     Not on file   Tobacco Use     Smoking status: Never Smoker     Smokeless tobacco: Never Used   Substance and Sexual Activity     Alcohol use: Yes     Alcohol/week: 1.0 standard drink     Drug use: Never     Sexual activity: Yes     Partners: Female   Other Topics Concern     Not on file   Social History Narrative    Works for [employer]      walking     Social Determinants of Health     Financial Resource Strain: Not on file   Food Insecurity: Not on file   Transportation Needs: Not on file   Physical Activity: Not on file   Stress: Not on file   Social Connections: Not on file   Intimate Partner Violence: Not on file   Housing Stability: Not on file            Lab Results    Chemistry/lipid CBC Cardiac Enzymes/BNP/TSH/INR   Lab Results   Component Value Date    BUN 8 02/02/2022     02/02/2022    CO2 28 02/02/2022    Lab Results   Component Value Date    WBC 5.6 02/02/2022    HGB 16.1 02/02/2022    HCT 48.9 02/02/2022    MCV 89 02/02/2022     02/02/2022    Lab Results   Component Value Date    TROPONINI  <0.01 02/02/2022                cc:   Roland Lucero MD  [facility]  [facility] [place] [address]

## 2022-02-04 NOTE — PROGRESS NOTES
Thank you, Harjinder Peterson, for asking the [facility] team to see Mr. Neville Neil to evaluate Consult        Assessment/Recommendations   Assessment:    1. Chest pain - may be related to a mild form of stress-cardiomyopathy or an acute grief response. May also be from a musculoskeletal shoulder injury or muscle/ligament strain.     Plan:  1. Echocardiogram to assess LV function.   2. Advised Mr. Neil to limit exercise to mild/moderate intensity until echo results are available. If any reduction in LV Function would need to limit exercise for 4-6 weeks. If LV function normal he could resume exercise as tolerated.         History of Present Illness   Mr. Neville Neil is a 36 year old healthy male who presents for evaluation of chest pain.      heard news of the death of his grandmother and developed acute onset chest pain. The pain was located in his left chest and left shoulder. Not associated with dyspnea, palpitations, or sweating. Was somewhat worse with activity but nor could he get comfortable at rest. After 45 minutes of pain he presented to the ER where he had an overall negative evaluation including negative troponin x2, non-ischemic ECG, normal chest x-ray, and eventual resolution of his pain. He did have some mild discomfort yesterday. He is generally healthy and active. He is exercising regularly for the past month on a treadmill, stationary bicycle and lifting weights. Prior to onset of pain 2 days ago he has had no exertional symptoms or limitations. He does not use tobacco or nicotine, does not use drugs, and has no family history of heart disease.    Other than noted above, Mr. Neil denies any chest pain/pressure/tightness, shortness of breath at rest or with exertion, light headedness/dizziness, pre-syncope, syncope, lower extremity swelling, palpitations, paroxysmal nocturnal dyspnea (PND), or orthopnea.     Cardiac Problems and Cardiac  "Diagnostics     Most Recent Cardiac testing:  ECG dated 2/2/22 (personaly reviewed and interpreted): normal sinus rhythm. Normal ECG.           Medications  Allergies   Current Outpatient Medications   Medication Sig Dispense Refill     Calcium Polycarbophil (FIBER) 625 MG tablet Take 1 tablet by mouth daily as needed for constipation       MULTIPLE VITAMIN PO Take 1 tablet by mouth daily       Famotidine (PEPCID PO) Take 20 tablets by mouth daily as needed for indigestion (Patient not taking: Reported on 2/4/2022)        Allergies   Allergen Reactions     Azithromycin Other (See Comments)     PN: Uncontrolled shaking with higher dose(1st day), PN: Uncontrolled shaking with higher dose(1st day)        Physical Examination Review of Systems   Vitals: BP 94/70 (BP Location: Left arm, Patient Position: Sitting, Cuff Size: Adult Large)   Pulse 70   Resp 16   Ht 1.803 m (5' 11\")   Wt 86.6 kg (191 lb)   BMI 26.64 kg/m    BMI= Body mass index is 26.64 kg/m .  Wt Readings from Last 3 Encounters:   02/04/22 86.6 kg (191 lb)   02/02/22 83.9 kg (185 lb)   09/30/21 87.5 kg (192 lb 12.8 oz)       General Appearance:   Pleasant male, appears stated age. no acute distress, normal body habitus   ENT/Mouth: membranes moist, no apparent gingival bleeding.      EYES:  no scleral icterus, normal conjunctivae   Neck: no carotid bruits. supple   Respiratory:   lungs are clear to auscultation, no rales or wheezing, equal chest wall expansion    Cardiovascular:   Regular rhythm, normal rate. Normal first and second heart sounds with no murmurs, rubs, or gallops; radial pulses 2+ bilaterally. Jugular venous pressure normal, no edema bilaterally    Abdomen/GI:  Soft, non-tender   Extremities: no cyanosis or clubbing   Skin: no xanthelasma, warm.    Heme/lymph/ Immunology No apparent bleeding noted.   Neurologic: Alert and oriented. normal gait, no tremors   Psychiatric: Pleasant, calm, appropriate affect.         Please refer above for " cardiac ROS details.       Past History   Past Medical History: History reviewed. No pertinent past medical history.     Past Surgical History: History reviewed. No pertinent surgical history.     Family History:   Family History   Problem Relation Age of Onset     Hyperlipidemia Mother      Hypertension Mother      Prostate Cancer Father 74.00     Prostate Cancer Paternal Grandfather         Social History:   Social History     Socioeconomic History     Marital status:      Spouse name: Not on file     Number of children: Not on file     Years of education: Not on file     Highest education level: Not on file   Occupational History     Not on file   Tobacco Use     Smoking status: Never Smoker     Smokeless tobacco: Never Used   Substance and Sexual Activity     Alcohol use: Yes     Alcohol/week: 1.0 standard drink     Drug use: Never     Sexual activity: Yes     Partners: Female   Other Topics Concern     Not on file   Social History Narrative    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     Social Determinants of Health     Financial Resource Strain: Not on file   Food Insecurity: Not on file   Transportation Needs: Not on file   Physical Activity: Not on file   Stress: Not on file   Social Connections: Not on file   Intimate Partner Violence: Not on file   Housing Stability: Not on file            Lab Results    Chemistry/lipid CBC Cardiac Enzymes/BNP/TSH/INR   Lab Results   Component Value Date    BUN 8 02/02/2022     02/02/2022    CO2 28 02/02/2022    Lab Results   Component Value Date    WBC 5.6 02/02/2022    HGB 16.1 02/02/2022    HCT 48.9 02/02/2022    MCV 89 02/02/2022     02/02/2022    Lab Results   Component Value Date    TROPONINI <0.01 02/02/2022

## 2022-02-04 NOTE — PATIENT INSTRUCTIONS
It was a pleasure to meet with you today.      Below is a summary of your visit.   1. Schedule an echocardiogram to evaluate the function of your heart.   2. Until the echo is completed, limit your exercise to light/moderate intensity.   3. Follow up as needed with new or worsening symptoms.    We will call you to inform you of your test or procedure results within 3 business days of the test being performed.  If you do not hear from our office with the test results within 1 week please do not hesitate to call asking for these results.     Please do not hesitate to call the [facility] clinic with any questions or concerns at [phone]. You can also reach my nurse, Cathryn, during normal business hours at [phone].    Sincerely,

## 2022-07-29 ENCOUNTER — TRANSFERRED RECORDS (OUTPATIENT)
Dept: HEALTH INFORMATION MANAGEMENT | Facility: CLINIC | Age: 37
End: 2022-07-29

## 2022-09-02 ENCOUNTER — TRANSFERRED RECORDS (OUTPATIENT)
Dept: HEALTH INFORMATION MANAGEMENT | Facility: CLINIC | Age: 37
End: 2022-09-02

## 2022-10-01 ENCOUNTER — HEALTH MAINTENANCE LETTER (OUTPATIENT)
Age: 37
End: 2022-10-01

## 2022-10-25 ENCOUNTER — ALLIED HEALTH/NURSE VISIT (OUTPATIENT)
Dept: FAMILY MEDICINE | Facility: CLINIC | Age: 37
End: 2022-10-25
Payer: COMMERCIAL

## 2022-10-25 DIAGNOSIS — Z23 ENCOUNTER FOR IMMUNIZATION: Primary | ICD-10-CM

## 2022-10-25 PROCEDURE — 90471 IMMUNIZATION ADMIN: CPT

## 2022-10-25 PROCEDURE — 99207 PR NO CHARGE NURSE ONLY: CPT

## 2022-10-25 PROCEDURE — 90686 IIV4 VACC NO PRSV 0.5 ML IM: CPT

## 2022-10-25 NOTE — PROGRESS NOTES
Checo was in-clinic with his daughter today for her [facility].  He asked to receive his flu vaccine.  Administered the vaccine in his left deltoid.      Rohit CMA

## 2023-01-03 ENCOUNTER — TRANSFERRED RECORDS (OUTPATIENT)
Dept: HEALTH INFORMATION MANAGEMENT | Facility: CLINIC | Age: 38
End: 2023-01-03
Payer: COMMERCIAL

## 2023-02-02 ENCOUNTER — OFFICE VISIT (OUTPATIENT)
Dept: FAMILY MEDICINE | Facility: CLINIC | Age: 38
End: 2023-02-02
Payer: COMMERCIAL

## 2023-02-02 VITALS
HEIGHT: 71 IN | HEART RATE: 64 BPM | OXYGEN SATURATION: 99 % | WEIGHT: 195.55 LBS | TEMPERATURE: 97.5 F | SYSTOLIC BLOOD PRESSURE: 130 MMHG | BODY MASS INDEX: 27.38 KG/M2 | DIASTOLIC BLOOD PRESSURE: 80 MMHG

## 2023-02-02 DIAGNOSIS — Z00.00 ANNUAL PHYSICAL EXAM: Primary | ICD-10-CM

## 2023-02-02 DIAGNOSIS — Z00.00 HEALTHCARE MAINTENANCE: ICD-10-CM

## 2023-02-02 DIAGNOSIS — E78.5 HYPERLIPIDEMIA WITH TARGET LDL LESS THAN 130: ICD-10-CM

## 2023-02-02 DIAGNOSIS — Z13.220 SCREENING FOR HYPERLIPIDEMIA: ICD-10-CM

## 2023-02-02 DIAGNOSIS — K21.9 GASTROESOPHAGEAL REFLUX DISEASE WITHOUT ESOPHAGITIS: ICD-10-CM

## 2023-02-02 PROCEDURE — 99395 PREV VISIT EST AGE 18-39: CPT | Performed by: STUDENT IN AN ORGANIZED HEALTH CARE EDUCATION/TRAINING PROGRAM

## 2023-02-02 ASSESSMENT — ENCOUNTER SYMPTOMS
EYE PAIN: 0
DYSURIA: 0
SORE THROAT: 0
DIZZINESS: 0
COUGH: 0
PALPITATIONS: 0
ABDOMINAL PAIN: 0
DIARRHEA: 0
HEARTBURN: 1
WEAKNESS: 0
NERVOUS/ANXIOUS: 0
PARESTHESIAS: 0
MYALGIAS: 0
CONSTIPATION: 0
HEADACHES: 0
CHILLS: 0
FREQUENCY: 0
FEVER: 0
NAUSEA: 0
ARTHRALGIAS: 0
JOINT SWELLING: 0
SHORTNESS OF BREATH: 0
HEMATURIA: 0
HEMATOCHEZIA: 0

## 2023-02-02 ASSESSMENT — PAIN SCALES - GENERAL: PAINLEVEL: NO PAIN (0)

## 2023-02-02 NOTE — PROGRESS NOTES
Assessment/ Plan   37-year-old male with past history of GERD and tongue-tie who presents for annual physical exam.  Patient did not want to do blood work nor COVID booster today.  He was otherwise up-to-date.    1. Screening for hyperlipidemia    2. Hyperlipidemia with target LDL less than 130    3. Gastroesophageal reflux disease without esophagitis  Takes pepcid as needed. Diet controlled as well.     4. Healthcare maintenance    5. Annual physical exam    Follow-up in: 1 year for physical    Mynor Gale MD    Subjective:     Neville Neil is a 37 year old male who presents for an annual exam.     Chief Complaint   Patient presents with     Physical     Colonoscopy:   PSA: dad diagnosed in 60's and grandpa diagnosed in 70's  No results found for: PSA     Answers for HPI/ROS submitted by the patient on 2/2/2023  Frequency of exercise:: 2-3 days/week  Getting at least 3 servings of Calcium per day:: NO  Diet:: Regular (no restrictions)  Taking medications regularly:: Yes  Medication side effects:: Not applicable  Bi-annual eye exam:: NO  Dental care twice a year:: Yes  Sleep apnea or symptoms of sleep apnea:: None  abdominal pain: No  Blood in stool: No  Blood in urine: No  chest pain: No  chills: No  congestion: No  constipation: No  cough: No  diarrhea: No  dizziness: No  ear pain: No  eye pain: No  nervous/anxious: No  fever: No  frequency: No  genital sores: No  headaches: No  hearing loss: Yes  heartburn: Yes  arthralgias: No  joint swelling: No  peripheral edema: No  mood changes: No  myalgias: No  nausea: No  dysuria: No  palpitations: No  Skin sensation changes: No  sore throat: No  urgency: No  rash: No  shortness of breath: No  visual disturbance: No  weakness: No  Additional concerns today:: No  Duration of exercise:: 15-30 minutes    Immunization History   Administered Date(s) Administered     COVID-19 Vaccine 12+ (Pfizer) 03/30/2021, 04/22/2021, 12/11/2021     Influenza Vaccine 50-64 or 18-64  w/egg allergy (Flublok) 12/09/2021     Influenza Vaccine >6 months (Alfuria,Fluzone) 12/03/2019, 12/15/2020, 10/25/2022     Tdap (Adacel,Boostrix) 02/23/2010, 11/28/2017     Typhoid IM 01/02/2008     Immunization status: due today.     Current Outpatient Medications   Medication Sig Dispense Refill     Calcium Polycarbophil (FIBER) 625 MG tablet Take 1 tablet by mouth daily as needed for constipation       Famotidine (PEPCID PO) Take 20 tablets by mouth daily as needed for indigestion       MULTIPLE VITAMIN PO Take 1 tablet by mouth daily       No past medical history on file.  No past surgical history on file.  Azithromycin  Family History   Problem Relation Age of Onset     Hyperlipidemia Mother      Hypertension Mother      Prostate Cancer Father 74.00     Prostate Cancer Paternal Grandfather      Social History     Socioeconomic History     Marital status:      Spouse name: Not on file     Number of children: Not on file     Years of education: Not on file     Highest education level: Not on file   Occupational History     Not on file   Tobacco Use     Smoking status: Never     Smokeless tobacco: Never   Substance and Sexual Activity     Alcohol use: Yes     Alcohol/week: 1.0 standard drink     Drug use: Never     Sexual activity: Yes     Partners: Female   Other Topics Concern     Not on file   Social History Narrative    Works for [employer]      walking     Social Determinants of Health     Financial Resource Strain: Not on file   Food Insecurity: Not on file   Transportation Needs: Not on file   Physical Activity: Not on file   Stress: Not on file   Social Connections: Not on file   Intimate Partner Violence: Not on file   Housing Stability: Not on file       Review of Systems  Complete ROS negative except as noted in the HPI    Objective:      Vitals:    02/02/23 1656   BP: 130/80   Pulse: 64   Temp: 97.5  F (36.4  C)   SpO2: 99%   Weight: 88.7 kg (195 lb 8.8 oz)   Height: 1.803 m  "(5' 11\")       General appearance: Alert, cooperative, no distress, appears stated age  Head: Normocephalic, atraumatic, without obvious abnormality  EARS: TM's gray dull with structures seen bilaterally  Eyes: Pupils equal round, reactive.  Conjunctiva clear.  Nose: Nares normal, no drainage.  Throat: Lips, mucosa, tongue normal mucosa pink and moist  Neck: Supple, symmetric, trachea midline, no adenopathy.  No thyroid enlargement, tenderness or nodules.    Lungs: Clear to auscultation bilaterally, no wheezing or crackles present.  Respirations unlabored  Heart: Regular rate and rhythm, normal S1 and S2, no murmur, rub or gallop.  Abdomen: Soft, nontender, nondistended.  Bowel sounds active in all 4 quadrants.  No masses or organomegaly.  Extremities: Extremities normal, atraumatic.  No cyanosis or edema.  Skin: Skin color, texture, turgor normal no rashes or lesions on limited skin exam  Neurologic: CN II through XII intact, normal strength.      Mynor Lebron MD    "

## 2023-08-07 ENCOUNTER — HOSPITAL ENCOUNTER (EMERGENCY)
Facility: CLINIC | Age: 38
Discharge: HOME | End: 2023-08-07
Admitting: EMERGENCY MEDICINE
Payer: COMMERCIAL

## 2023-08-07 ENCOUNTER — APPOINTMENT (OUTPATIENT)
Dept: CT IMAGING | Facility: CLINIC | Age: 38
End: 2023-08-07
Attending: EMERGENCY MEDICINE
Payer: COMMERCIAL

## 2023-08-07 VITALS
TEMPERATURE: 98.2 F | WEIGHT: 182.1 LBS | OXYGEN SATURATION: 100 % | DIASTOLIC BLOOD PRESSURE: 86 MMHG | HEIGHT: 71 IN | BODY MASS INDEX: 25.49 KG/M2 | SYSTOLIC BLOOD PRESSURE: 146 MMHG | RESPIRATION RATE: 18 BRPM | HEART RATE: 79 BPM

## 2023-08-07 DIAGNOSIS — R42 LIGHT HEADEDNESS: ICD-10-CM

## 2023-08-07 DIAGNOSIS — R41.89 BRAIN FOG: ICD-10-CM

## 2023-08-07 LAB
ATRIAL RATE - MUSE: 71 BPM
DIASTOLIC BLOOD PRESSURE - MUSE: NORMAL MMHG
INTERPRETATION ECG - MUSE: NORMAL
P AXIS - MUSE: 63 DEGREES
PR INTERVAL - MUSE: 122 MS
QRS DURATION - MUSE: 94 MS
QT - MUSE: 392 MS
QTC - MUSE: 425 MS
R AXIS - MUSE: 71 DEGREES
SYSTOLIC BLOOD PRESSURE - MUSE: NORMAL MMHG
T AXIS - MUSE: 61 DEGREES
VENTRICULAR RATE- MUSE: 71 BPM

## 2023-08-07 PROCEDURE — 99284 EMERGENCY DEPT VISIT MOD MDM: CPT | Mod: 25

## 2023-08-07 PROCEDURE — 70450 CT HEAD/BRAIN W/O DYE: CPT

## 2023-08-07 PROCEDURE — 93005 ELECTROCARDIOGRAM TRACING: CPT | Performed by: EMERGENCY MEDICINE

## 2023-08-07 RX ORDER — METRONIDAZOLE 500 MG/1
TABLET ORAL
COMMUNITY
Start: 2023-07-27 | End: 2024-03-22

## 2023-08-07 ASSESSMENT — ACTIVITIES OF DAILY LIVING (ADL): ADLS_ACUITY_SCORE: 35

## 2023-08-07 ASSESSMENT — ENCOUNTER SYMPTOMS
CHILLS: 0
FEVER: 0
WEAKNESS: 0
ABDOMINAL PAIN: 0
BLOOD IN STOOL: 0
VOMITING: 0
SHORTNESS OF BREATH: 0
LIGHT-HEADEDNESS: 1
DIAPHORESIS: 1
HEADACHES: 1
DIARRHEA: 0
NAUSEA: 1
NUMBNESS: 0

## 2023-08-07 NOTE — ED PROVIDER NOTES
[facility] DEPARTMENT ENCOUNTER      NAME: Neville Neil  AGE: 37 year old male  YOB: 1985  MRN: [id number]  EVALUATION DATE & TIME: 8/7/2023  2:45 PM    [facility]    ED PROVIDER: Christen Gooden PA-C      Chief Complaint   Patient presents with    Medication Reaction         FINAL IMPRESSION:  1. Light headedness    2. Brain fog          ED COURSE & MEDICAL DECISION MAKING:    Pertinent Labs & Imaging studies reviewed. (See chart for details)    37 year old male presents to the Emergency Department for evaluation of multiple concerns.    Physical exam is remarkable for a well-appearing male who is in no acute distress.  Heart and lung sounds are clear diffusely throughout.  Abdomen is soft and nontender.  No focal neurologic deficits noted, cranial nerves III through XII appear grossly intact.  Strength is 5 out of 5 in the upper and lower extremities bilaterally.  Vital signs remarkable for mild hypertension but otherwise stable and he is afebrile.    CT scan of the head is unremarkable with no evidence of intracranial bleeding or masses.  EKG unremarkable with no acute ischemic changes.  I did recommend laboratory evaluation which the patient declined.    I do not think any further emergent labs or imaging are indicated at this time.  He has a completely normal neurologic exam here and denies any trauma to the head, he is not anticoagulated so low risk for delayed bleeding.  Though he does endorse a headache yesterday, his symptoms do not sound consistent with a subarachnoid hemorrhage.  He does not have any meningismus or fevers concerning for meningitis.  I do suspect his symptoms are side effect from the Flagyl, he notes that historically he has not tolerated medicines very well.  Advised him to finish the course if possible, recommend meclizine to help with symptom control and follow-up with GI as previously recommended.  Advised return here for any new or  worsening symptoms.  The patient is agreeable with this treatment plan and verbalized his understanding.    Medical Decision Making    History:  Supplemental history from: Documented in chart, if applicable  External Record(s) reviewed: Outpatient Record: GI clinic visits and RN triage note from today    Work Up:  Chart documentation includes differential considered and any EKGs or imaging independently interpreted by provider, where specified.  In additional to work up documented, I considered the following work up: Labs CBC, BMP, magnesium, TSH, but deferred due to patient declined.    External consultation:  Discussion of management with another provider: Documented in chart, if applicable    Complicating factors:  Care impacted by chronic illness: N/A  Care affected by social determinants of health: N/A    Disposition considerations: Discharge. I recommended the patient continue their current prescription strength medication(s): Flagyl if he can tolerate it. N/A.    ED Course   3:10 PM Performed my initial history and physical exam. Discussed workup in the emergency department, management of symptoms, and likely disposition.   3:47 PM Updated with test results. I discussed the plan for discharge with the patient or family and they are agreeable.. We discussed supportive cares at home and reasons for return to the ER including new or worsening symptoms - all questions and concerns addressed. Patient to be discharged by RN.    At the conclusion of the encounter I discussed the results of all of the tests and the disposition. The questions were answered. The patient or family acknowledged understanding and was agreeable with the care plan.     Voice recognition software was used in the creation of this note. Any grammatical or nonsensical errors are due to inherent errors with the software and are not the intention of the writer.     MEDICATIONS GIVEN IN THE EMERGENCY:  Medications - No data to display    NEW  PRESCRIPTIONS STARTED AT TODAY'S ER VISIT  New Prescriptions    No medications on file            =================================================================    HPI    Patient information was obtained from: Patient    Use of : N/A         Neville Neil is a 37 year old male with PMH of GERD who presents to the ED via walk-in for evaluation of multiple concerns.    The patient states that he was started on a one week course of metronidazole 5 days ago by GI for parasites.  The day after starting the medicine, he noted a variety of symptoms including night sweats, brain fog, lightheadedness, nausea, and intermittent headaches.  He notes that the symptoms seem to improve throughout the day but are worst after taking his doses.  He states he has not been drinking alcohol while taking this medication.  He denies any recent falls or injury to his head.  He has not tried any specific medications or treatments for his symptoms.    He denies any fevers, chills, vision loss, vomiting, diarrhea, black or bloody stools, syncope, chest pain, abdominal pain, or difficulty breathing.    REVIEW OF SYSTEMS   Review of Systems   Constitutional:  Positive for diaphoresis. Negative for chills and fever.   Eyes:  Negative for visual disturbance.   Respiratory:  Negative for shortness of breath.    Cardiovascular:  Negative for chest pain.   Gastrointestinal:  Positive for nausea. Negative for abdominal pain, blood in stool, diarrhea and vomiting.   Neurological:  Positive for light-headedness and headaches. Negative for syncope, weakness and numbness.   Psychiatric/Behavioral:          Brain fog       All other systems reviewed and are negative unless noted in HPI.      PAST MEDICAL HISTORY:  No past medical history on file.    PAST SURGICAL HISTORY:  No past surgical history on file.    CURRENT MEDICATIONS:    metroNIDAZOLE (FLAGYL) 500 MG tablet  Calcium Polycarbophil (FIBER) 625 MG tablet  Famotidine (PEPCID  "PO)  MULTIPLE VITAMIN PO        ALLERGIES:  Allergies   Allergen Reactions    Azithromycin Other (See Comments)     PN: Uncontrolled shaking with higher dose(1st day), PN: Uncontrolled shaking with higher dose(1st day)       FAMILY HISTORY:  Family History   Problem Relation Age of Onset    Hyperlipidemia Mother     Hypertension Mother     Prostate Cancer Father 74.00    Prostate Cancer Paternal Grandfather        SOCIAL HISTORY:   Social History     Socioeconomic History    Marital status:    Tobacco Use    Smoking status: Never    Smokeless tobacco: Never   Substance and Sexual Activity    Alcohol use: Not Currently    Drug use: Never    Sexual activity: Yes     Partners: Female     Comment: wife   Social History Narrative    Works for [employer]      walking       VITALS:  Patient Vitals for the past 24 hrs:   BP Temp Temp src Pulse Resp SpO2 Height Weight   08/07/23 1348 (!) 146/86 98.2  F (36.8  C) Temporal 79 18 100 % 1.803 m (5' 11\") 82.6 kg (182 lb 1.6 oz)       PHYSICAL EXAM    VITAL SIGNS: BP (!) 146/86   Pulse 79   Temp 98.2  F (36.8  C) (Temporal)   Resp 18   Ht 1.803 m (5' 11\")   Wt 82.6 kg (182 lb 1.6 oz)   SpO2 100%   BMI 25.40 kg/m    General Appearance: Alert, cooperative, normal speech and facial symmetry, appears stated age, the patient does not appear in distress  Head:  Normocephalic, without obvious abnormality, atraumatic  Eyes: Conjunctiva/corneas clear, EOM's intact, no nystagmus, PERRL  ENT:  Lips, mucosa, and tongue normal; teeth and gums normal, no pharyngeal inflammation, no dysphonia or difficulty swallowing, membranes are moist without pallor  Cardio:  Regular rate and rhythm, S1 and S2 normal, no murmur, rub    or gallop, 2+ pulses symmetric in all extremities  Pulm:  Clear to auscultation bilaterally, respirations unlabored with no accessory muscle use  Abdomen:  Abdomen is soft, non-distended with no tenderness to palpation, rebound tenderness, or " guarding.   Extremities:  Extremities normal, there is no tenderness to palpation, atraumatic, no cyanosis or edema, full function and range of motion, pulses equal in all extremities, normal cap refill, no joint swelling; strength is 5/5 in the upper and lower extremities bilaterally  Neuro: Patient is awake, alert, and responsive to voice. No gross motor weaknesses or sensory loss; moves all extremities. Cranial Nerves:  CN2: No funduscopic exam performed. CN3,4 & 6: Pupillary light response, lateral and vertical gaze normal.  No nystagmus.  CN7: No facial weakness, smile, facial symmetry intact. CN8: Intact to spoken voice. CN9&10: Gag reflex, uvula midline, palate rises with phonation. CN11: Shoulder shrug 5/5 intact bilaterally. CN12: Tongue midline and moves freely from side to side.      LAB:  All pertinent labs reviewed and interpreted.  Labs Ordered and Resulted from Time of ED Arrival to Time of ED Departure - No data to display    RADIOLOGY:  Reviewed all pertinent imaging. Please see official radiology report.  Head CT w/o contrast   Final Result   IMPRESSION:   1.  Normal head CT.          EKG:    Performed at: 15:46    I have independently reviewed and interpreted the EKG, along with the final read. EKG also reviewed by Dr. Fabio Samano.    Ventricular rate 71 bpm  KS interval 122 ms  QRS duration 94 ms  QT/QTc 392/425 ms  P-R-T axes 63 71 61    Impression: NSR        Christen Gooden PA-C  Emergency Medicine  [facility] EMERGENCY ROOM  [address] [facility] [id number] [phone]  Dept: [phone]       Christen Gooden PA-C  08/07/23 1082

## 2023-08-07 NOTE — ED TRIAGE NOTES
Arrives to ED with c/o medication reaction. Taking flagyl. Has been experiencing side effects including dizziness, coordination issues, memory loss. Pt instructed to be evaluated if sx persist. Has been on flagyl for 5 days.      Triage Assessment       Row Name 08/07/23 4554       Triage Assessment (Adult)    Airway WDL WDL       Respiratory WDL    Respiratory WDL WDL       Skin Circulation/Temperature WDL    Skin Circulation/Temperature WDL WDL       Cardiac WDL    Cardiac WDL WDL       Peripheral/Neurovascular WDL    Peripheral Neurovascular WDL WDL       Cognitive/Neuro/Behavioral WDL    Cognitive/Neuro/Behavioral WDL WDL

## 2023-08-07 NOTE — DISCHARGE INSTRUCTIONS
You were seen here today for evaluation of multiple concerns.  Your work-up today is reassuring, your CT scan is normal and your EKG is also normal.    As we discussed, this is likely a side effect of the medication you are taking.  You can try taking meclizine (sold over-the-counter as nondrowsy Dramamine) for lightheadedness.  Take the metronidazole on a full stomach to help prevent side effects.    Follow-up with your primary care provider if your symptoms persist after you stop the Flagyl.  Return here for any new or worsening symptoms including passing out, chest pain, difficulty breathing, fever, or any other symptoms that concern him.

## 2023-12-15 ENCOUNTER — ALLIED HEALTH/NURSE VISIT (OUTPATIENT)
Dept: FAMILY MEDICINE | Facility: CLINIC | Age: 38
End: 2023-12-15
Payer: COMMERCIAL

## 2023-12-15 DIAGNOSIS — Z23 ENCOUNTER FOR IMMUNIZATION: Primary | ICD-10-CM

## 2023-12-15 PROCEDURE — 90686 IIV4 VACC NO PRSV 0.5 ML IM: CPT

## 2023-12-15 PROCEDURE — 99207 PR NO CHARGE NURSE ONLY: CPT

## 2023-12-15 PROCEDURE — 90471 IMMUNIZATION ADMIN: CPT

## 2023-12-15 NOTE — PROGRESS NOTES
Prior to immunization administration, verified patients identity using patient s name and date of birth. Please see Immunization Activity for additional information.     Screening Questionnaire for Adult Immunization    Are you sick today?   No   Do you have allergies to medications, food, a vaccine component or latex?   No   Have you ever had a serious reaction after receiving a vaccination?   No   Do you have a long-term health problem with heart, lung, kidney, or metabolic disease (e.g., diabetes), asthma, a blood disorder, no spleen, complement component deficiency, a cochlear implant, or a spinal fluid leak?  Are you on long-term aspirin therapy?   No   Do you have cancer, leukemia, HIV/AIDS, or any other immune system problem?   No   Do you have a parent, brother, or sister with an immune system problem?   Yes   In the past 3 months, have you taken medications that affect  your immune system, such as prednisone, other steroids, or anticancer drugs; drugs for the treatment of rheumatoid arthritis, Crohn s disease, or psoriasis; or have you had radiation treatments?   No   Have you had a seizure, or a brain or other nervous system problem?   No   During the past year, have you received a transfusion of blood or blood    products, or been given immune (gamma) globulin or antiviral drug?   No   For women: Are you pregnant or is there a chance you could become       pregnant during the next month?   NA   Have you received any vaccinations in the past 4 weeks?   No     Immunization questionnaire answers were all negative.    I have reviewed the following standing orders:   This patient is due and qualifies for the Influenza vaccine.    Click here for Influenza Vaccine Standing Order    I have reviewed the vaccines inclusion and exclusion criteria; No concerns regarding eligibility.     Patient instructed to remain in clinic for 15 minutes afterwards, and to report any adverse reactions.     Screening performed by  Rhianna Forrester on 12/15/2023 at 8:28 AM.

## 2024-03-03 ENCOUNTER — HEALTH MAINTENANCE LETTER (OUTPATIENT)
Age: 39
End: 2024-03-03

## 2024-03-22 ENCOUNTER — ANCILLARY PROCEDURE (OUTPATIENT)
Dept: GENERAL RADIOLOGY | Facility: CLINIC | Age: 39
End: 2024-03-22
Attending: STUDENT IN AN ORGANIZED HEALTH CARE EDUCATION/TRAINING PROGRAM
Payer: COMMERCIAL

## 2024-03-22 ENCOUNTER — OFFICE VISIT (OUTPATIENT)
Dept: FAMILY MEDICINE | Facility: CLINIC | Age: 39
End: 2024-03-22
Payer: COMMERCIAL

## 2024-03-22 VITALS
HEART RATE: 74 BPM | WEIGHT: 194 LBS | SYSTOLIC BLOOD PRESSURE: 128 MMHG | TEMPERATURE: 98.1 F | OXYGEN SATURATION: 99 % | HEIGHT: 71 IN | DIASTOLIC BLOOD PRESSURE: 82 MMHG | BODY MASS INDEX: 27.16 KG/M2 | RESPIRATION RATE: 18 BRPM

## 2024-03-22 DIAGNOSIS — D22.5 MELANOCYTIC NEVUS OF TRUNK: ICD-10-CM

## 2024-03-22 DIAGNOSIS — R05.3 CHRONIC COUGH: ICD-10-CM

## 2024-03-22 DIAGNOSIS — Z80.0 FAMILY HISTORY OF COLON CANCER: ICD-10-CM

## 2024-03-22 DIAGNOSIS — Z00.00 ROUTINE GENERAL MEDICAL EXAMINATION AT A HEALTH CARE FACILITY: ICD-10-CM

## 2024-03-22 DIAGNOSIS — Z00.00 ENCOUNTER FOR PREVENTATIVE ADULT HEALTH CARE EXAMINATION: Primary | ICD-10-CM

## 2024-03-22 DIAGNOSIS — Z30.09 GENERAL COUNSELLING AND ADVICE ON CONTRACEPTION: ICD-10-CM

## 2024-03-22 LAB
CHOLEST SERPL-MCNC: 231 MG/DL
FASTING STATUS PATIENT QL REPORTED: NO
HDLC SERPL-MCNC: 43 MG/DL
LDLC SERPL CALC-MCNC: 160 MG/DL
NONHDLC SERPL-MCNC: 188 MG/DL
TRIGL SERPL-MCNC: 139 MG/DL

## 2024-03-22 PROCEDURE — 99395 PREV VISIT EST AGE 18-39: CPT | Performed by: STUDENT IN AN ORGANIZED HEALTH CARE EDUCATION/TRAINING PROGRAM

## 2024-03-22 PROCEDURE — 71046 X-RAY EXAM CHEST 2 VIEWS: CPT | Mod: TC | Performed by: RADIOLOGY

## 2024-03-22 PROCEDURE — 80061 LIPID PANEL: CPT | Performed by: STUDENT IN AN ORGANIZED HEALTH CARE EDUCATION/TRAINING PROGRAM

## 2024-03-22 PROCEDURE — 99213 OFFICE O/P EST LOW 20 MIN: CPT | Mod: 25 | Performed by: STUDENT IN AN ORGANIZED HEALTH CARE EDUCATION/TRAINING PROGRAM

## 2024-03-22 PROCEDURE — 36415 COLL VENOUS BLD VENIPUNCTURE: CPT | Performed by: STUDENT IN AN ORGANIZED HEALTH CARE EDUCATION/TRAINING PROGRAM

## 2024-03-22 RX ORDER — MULTIVIT-MIN/IRON/FOLIC ACID/K 18-600-40
1 CAPSULE ORAL DAILY
COMMUNITY

## 2024-03-22 SDOH — HEALTH STABILITY: PHYSICAL HEALTH: ON AVERAGE, HOW MANY DAYS PER WEEK DO YOU ENGAGE IN MODERATE TO STRENUOUS EXERCISE (LIKE A BRISK WALK)?: 2 DAYS

## 2024-03-22 ASSESSMENT — SOCIAL DETERMINANTS OF HEALTH (SDOH): HOW OFTEN DO YOU GET TOGETHER WITH FRIENDS OR RELATIVES?: ONCE A WEEK

## 2024-03-22 NOTE — PROGRESS NOTES
Preventive Care Visit  [facility]  Jovi Castillo MD, Family Medicine  Mar 22, 2024      Assessment & Plan     Encounter for preventative adult health care examination  Checo is a 38-year-old male who presents today for adult preventative exam.  Does have family history of colon cancer in brother at age 41, he had a colonoscopy done in February 13, 2024 with normal results.  Recommend repeat colonoscopy in 5 years.  Recommend for, he was agreeable and this was ordered.  Discussed vaccinations, he is due for COVID vaccination but declines today.  Patient has history of GERD, well-controlled.      - Lipid panel reflex to direct LDL Fasting    Chronic cough  Has had chronic productive cough of yellow-green sputum, non purulent, likely secondary to bronchitis however as it has been going on for now more than 3 weeks, do recommend chest x-ray.  He was agreeable and this was ordered today.  He may attempt Flonase.  He already is taking famotidine for reflux and reports that reflux is well-controlled..  - XR Chest 2 Views    General counselling and advice on contraception  Wife and he have 3 children, last is 2 months old.  He would like to talk about contraception today.  We discussed options, he is undecided on vasectomy at this point, however would be open to it.  He is going to think about the option and then notify me.  If wanting to get vasectomy done I am willing to put a urology referral in.    Family history of colon cancer  As above, brother had colon cancer stage III at age 41, patient had colonoscopy done February 13, 2024 with hemorrhoids, otherwise normal results, repeat in 5 years.    Atypical melanocytic nevus    Patient with 3 x 3 mm brown macule with no variegation of color, geographic in shape.  Appears benign, recommend observation only at this point, he will take pictures today next to ruler, and then repeat that in 6 months, if any growth or change in shape or color, should  "present again to clinic.          BMI  Estimated body mass index is 27.31 kg/m  as calculated from the following:    Height as of this encounter: 1.795 m (5' 10.67\").    Weight as of this encounter: 88 kg (194 lb).       Counseling  Appropriate preventive services were discussed with this patient, including applicable screening as appropriate for fall prevention, nutrition, physical activity, Tobacco-use cessation, weight loss and cognition.  Checklist reviewing preventive services available has been given to the patient.  Reviewed patient's diet, addressing concerns and/or questions.   He is at risk for lack of exercise and has been provided with information to increase physical activity for the benefit of his well-being.   He is at risk for psychosocial distress and has been provided with information to reduce risk.       Deborah Kessler is a 38 year old, presenting for the following:  Physical (Fasting. Discuss birth control options./Recovering from 1 month cough.)        3/22/2024     7:54 AM   Additional Questions   Roomed by Tammy TODD        Health Care Directive  Patient does not have a Health Care Directive or Living Will: Discussed advance care planning with patient; information given to patient to review.    HPI  Reports coughing for several weeks.  Was worse and has improved. Not getting reflux. No shortness of breath or chest pain. Does have productive cough with green-yellow sputum which is thick, every couple of days there will be a tinge of blood. Does not have the sensation of post nasal drip. Wife was having similar symptoms, coughing has been going on since end of February, hers has gotten better, but not yet gone.     Does have a mole that his wife thinks is misshapen on his back he would like to have looked at.     Had colon[facility] at [facility], no polyps but did have internal hemorrhoids. On February 13 2024        3/22/2024   General Health   How would you rate your overall physical " health? Good   Feel stress (tense, anxious, or unable to sleep) Only a little   (!) STRESS CONCERN      3/22/2024   Nutrition   Three or more servings of calcium each day? (!) I DON'T KNOW   Diet: Regular (no restrictions)   How many servings of fruit and vegetables per day? (!) 2-3   How many sweetened beverages each day? 0-1         3/22/2024   Exercise   Days per week of moderate/strenous exercise 2 days   (!) EXERCISE CONCERN      3/22/2024   Social Factors   Frequency of gathering with friends or relatives Once a week   Worry food won't last until get money to buy more No   Food not last or not have enough money for food? No   Do you have housing?  Yes   Are you worried about losing your housing? No   Lack of transportation? No   Unable to get utilities (heat,electricity)? No         3/22/2024   Dental   Dentist two times every year? Yes         3/22/2024   TB Screening   Were you born outside of the [place]? No         Today's PHQ-2 Score:       3/22/2024     7:58 AM   PHQ-2 ( 1999 [employer])   Q1: Little interest or pleasure in doing things 0   Q2: Feeling down, depressed or hopeless 0   PHQ-2 Score 0   Q1: Little interest or pleasure in doing things Not at all   Q2: Feeling down, depressed or hopeless Not at all   PHQ-2 Score 0           3/22/2024   Substance Use   Alcohol more than 3/day or more than 7/wk Not Applicable   Do you use any other substances recreationally? No     Social History     Tobacco Use    Smoking status: Never     Passive exposure: Never    Smokeless tobacco: Never   Vaping Use    Vaping Use: Never used   Substance Use Topics    Alcohol use: Not Currently    Drug use: Never           3/22/2024   STI Screening   New sexual partner(s) since last STI/HIV test? No         3/22/2024   Contraception/Family Planning   Questions about contraception or family planning (!) YES         Reviewed and updated as needed this visit by Provider                    No past medical history on file.  No past  "surgical history on file.  Lab work is in process  Labs reviewed in EPIC  BP Readings from Last 3 Encounters:   03/22/24 128/82   08/07/23 (!) 146/86   02/02/23 130/80    Wt Readings from Last 3 Encounters:   03/22/24 88 kg (194 lb)   08/07/23 82.6 kg (182 lb 1.6 oz)   02/02/23 88.7 kg (195 lb 8.8 oz)                  Patient Active Problem List   Diagnosis    Gastroesophageal reflux disease without esophagitis    Tongue tied    Healthcare maintenance     No past surgical history on file.    Social History     Tobacco Use    Smoking status: Never     Passive exposure: Never    Smokeless tobacco: Never   Substance Use Topics    Alcohol use: Not Currently     Family History   Problem Relation Age of Onset    Hyperlipidemia Mother     Hypertension Mother     Prostate Cancer Father 74    Colorectal Cancer Brother 41        stage III    Prostate Cancer Paternal Grandfather          Current Outpatient Medications   Medication Sig Dispense Refill    Ascorbic Acid (VITAMIN C) 500 MG CAPS Take 1 tablet by mouth daily      Famotidine (PEPCID PO) Take 20 tablets by mouth daily as needed for indigestion      MULTIPLE VITAMIN PO Take 1 tablet by mouth daily       Allergies   Allergen Reactions    Azithromycin Other (See Comments)     PN: Uncontrolled shaking with higher dose(1st day), PN: Uncontrolled shaking with higher dose(1st day)     Recent Labs   Lab Test 02/02/22  1133   ALT 17   CR 1.02   GFRESTIMATED >90   POTASSIUM 3.7          Review of Systems  Constitutional, neuro, ENT, endocrine, pulmonary, cardiac, gastrointestinal, genitourinary, musculoskeletal, integument and psychiatric systems are negative, except as otherwise noted.     Objective    Exam  /82 (BP Location: Right arm, Patient Position: Sitting, Cuff Size: Adult Regular)   Pulse 74   Temp 98.1  F (36.7  C) (Oral)   Resp 18   Ht 1.795 m (5' 10.67\")   Wt 88 kg (194 lb)   SpO2 99%   BMI 27.31 kg/m     Estimated body mass index is 27.31 kg/m  as " "calculated from the following:    Height as of this encounter: 1.795 m (5' 10.67\").    Weight as of this encounter: 88 kg (194 lb).    Physical Exam  GENERAL: alert and no distress  EYES: Eyes grossly normal to inspection, PERRL and conjunctivae and sclerae normal  HENT: ear canals and TM's normal, nose and mouth without ulcers or lesions  NECK: no adenopathy, no asymmetry, masses, or scars  RESP: lungs clear to auscultation - no rales, rhonchi or wheezes  CV: regular rate and rhythm, normal S1 S2, no S3 or S4, no murmur, click or rub, no peripheral edema  ABDOMEN: soft, nontender, no hepatosplenomegaly, no masses and bowel sounds normal  MS: no gross musculoskeletal defects noted, no edema  SKIN: 3 x 3 mm brown macule with no variegation of color, geographic in shape.  no suspicious lesions or rashes  NEURO: Normal strength and tone, mentation intact and speech normal  PSYCH: mentation appears normal, affect normal/bright        Signed Electronically by: Jovi Castillo MD    "

## 2024-03-22 NOTE — PATIENT INSTRUCTIONS
Preventive Care Advice   This is general advice given by our system to help you stay healthy. However, your care team may have specific advice just for you. Please talk to your care team about your preventive care needs.  Nutrition  Eat 5 or more servings of fruits and vegetables each day.  Try wheat bread, brown rice and whole grain pasta (instead of white bread, rice, and pasta).  Get enough calcium and vitamin D. Check the label on foods and aim for 100% of the RDA (recommended daily allowance).  Lifestyle  Exercise at least 150 minutes each week   (30 minutes a day, 5 days a week).  Do muscle strengthening activities 2 days a week. These help control your weight and prevent disease.  No smoking.  Wear sunscreen to prevent skin cancer.  Have a dental exam and cleaning every 6 months.  Yearly exams  See your health care team every year to talk about:  Any changes in your health.  Any medicines your care team has prescribed.  Preventive care, family planning, and ways to prevent chronic diseases.  Shots (vaccines)   HPV shots (up to age 26), if you've never had them before.  Hepatitis B shots (up to age 59), if you've never had them before.  COVID-19 shot: Get this shot when it's due.  Flu shot: Get a flu shot every year.  Tetanus shot: Get a tetanus shot every 10 years.  Pneumococcal, hepatitis A, and RSV shots: Ask your care team if you need these based on your risk.  Shingles shot (for age 50 and up).  General health tests  Diabetes screening:  Starting at age 35, Get screened for diabetes at least every 3 years.  If you are younger than age 35, ask your care team if you should be screened for diabetes.  Cholesterol test: At age 39, start having a cholesterol test every 5 years, or more often if advised.  Bone density scan (DEXA): At age 50, ask your care team if you should have this scan for osteoporosis (brittle bones).  Hepatitis C: Get tested at least once in your life.  STIs (sexually transmitted  infections)  Before age 24: Ask your care team if you should be screened for STIs.  After age 24: Get screened for STIs if you're at risk. You are at risk for STIs (including HIV) if:  You are sexually active with more than one person.  You don't use condoms every time.  You or a partner was diagnosed with a sexually transmitted infection.  If you are at risk for HIV, ask about PrEP medicine to prevent HIV.  Get tested for HIV at least once in your life, whether you are at risk for HIV or not.  Cancer screening tests  Cervical cancer screening: If you have a cervix, begin getting regular cervical cancer screening tests at age 21. Most people who have regular screenings with normal results can stop after age 65. Talk about this with your provider.  Breast cancer scan (mammogram): If you've ever had breasts, begin having regular mammograms starting at age 40. This is a scan to check for breast cancer.  Colon cancer screening: It is important to start screening for colon cancer at age 45.  Have a colonoscopy test every 10 years (or more often if you're at risk) Or, ask your provider about stool tests like a FIT test every year or Cologuard test every 3 years.  To learn more about your testing options, visit: https://www.[employer]/139398.pdf.  For help making a decision, visit: https://bit.ly/pq45453.  Prostate cancer screening test: If you have a prostate and are age 55 to 69, ask your provider if you would benefit from a yearly prostate cancer screening test.  Lung cancer screening: If you are a current or former smoker age 50 to 80, ask your care team if ongoing lung cancer screenings are right for you.  For informational purposes only. Not to replace the advice of your health care provider. Copyright   2023 [employer]. All rights reserved. Clinically reviewed by the [facility]. Aniika 118308 - REV 01/24.    Learning About Stress  What is stress?     Stress is your  body's response to a hard situation. Your body can have a physical, emotional, or mental response. Stress is a fact of life for most people, and it affects everyone differently. What causes stress for you may not be stressful for someone else.  A lot of things can cause stress. You may feel stress when you go on a job interview, take a test, or run a race. This kind of short-term stress is normal and even useful. It can help you if you need to work hard or react quickly. For example, stress can help you finish an important job on time.  Long-term stress is caused by ongoing stressful situations or events. Examples of long-term stress include long-term health problems, ongoing problems at work, or conflicts in your family. Long-term stress can harm your health.  How does stress affect your health?  When you are stressed, your body responds as though you are in danger. It makes hormones that speed up your heart, make you breathe faster, and give you a burst of energy. This is called the fight-or-flight stress response. If the stress is over quickly, your body goes back to normal and no harm is done.  But if stress happens too often or lasts too long, it can have bad effects. Long-term stress can make you more likely to get sick, and it can make symptoms of some diseases worse. If you tense up when you are stressed, you may develop neck, shoulder, or low back pain. Stress is linked to high blood pressure and heart disease.  Stress also harms your emotional health. It can make you m[place], tense, or depressed. Your relationships may suffer, and you may not do well at work or school.  What can you do to manage stress?  You can try these things to help manage stress:   Do something active. Exercise or activity can help reduce stress. Walking is a great way to get started. Even everyday activities such as housecleaning or yard work can help.  Try yoga or cece chi. These techniques combine exercise and meditation. You may need  some training at first to learn them.  Do something you enjoy. For example, listen to music or go to a movie. Practice your hobby or do volunteer work.  Meditate. This can help you relax, because you are not worrying about what happened before or what may happen in the future.  Do guided imagery. Imagine yourself in any setting that helps you feel calm. You can use online videos, books, or a teacher to guide you.  Do breathing exercises. For example:  From a standing position, bend forward from the waist with your knees slightly bent. Let your arms dangle close to the floor.  Breathe in slowly and deeply as you return to a standing position. Roll up slowly and lift your head last.  Hold your breath for just a few seconds in the standing position.  Breathe out slowly and bend forward from the waist.  Let your feelings out. Talk, laugh, cry, and express anger when you need to. Talking with supportive friends or family, a counselor, or a shannan leader about your feelings is a healthy way to relieve stress. Avoid discussing your feelings with people who make you feel worse.  Write. It may help to write about things that are bothering you. This helps you find out how much stress you feel and what is causing it. When you know this, you can find better ways to cope.  What can you do to prevent stress?  You might try some of these things to help prevent stress:  Manage your time. This helps you find time to do the things you want and need to do.  Get enough sleep. Your body recovers from the stresses of the day while you are sleeping.  Get support. Your family, friends, and community can make a difference in how you experience stress.  Limit your news feed. Avoid or limit time on social media or news that may make you feel stressed.  Do something active. Exercise or activity can help reduce stress. Walking is a great way to get started.  Where can you learn more?  Go to https://www.healthwise.net/patiented  Enter N032 in the  "search box to learn more about \"Learning About Stress.\"  Current as of: October 24, 2023               Content Version: 14.0    2522-6528 [employer].   Care instructions adapted under license by your healthcare professional. If you have questions about a medical condition or this instruction, always ask your healthcare professional. [facility] disclaims any warranty or liability for your use of this information.      "

## 2024-05-07 ENCOUNTER — E-VISIT (OUTPATIENT)
Dept: FAMILY MEDICINE | Facility: CLINIC | Age: 39
End: 2024-05-07
Payer: COMMERCIAL

## 2024-05-07 DIAGNOSIS — R05.3 CHRONIC COUGH: Primary | ICD-10-CM

## 2024-05-07 DIAGNOSIS — K21.00 GASTROESOPHAGEAL REFLUX DISEASE WITH ESOPHAGITIS WITHOUT HEMORRHAGE: ICD-10-CM

## 2024-05-07 PROBLEM — J34.89 NASAL OBSTRUCTION: Status: ACTIVE | Noted: 2023-05-01

## 2024-05-07 PROCEDURE — 99421 OL DIG E/M SVC 5-10 MIN: CPT | Performed by: STUDENT IN AN ORGANIZED HEALTH CARE EDUCATION/TRAINING PROGRAM

## 2024-05-07 RX ORDER — OMEPRAZOLE 40 MG/1
40 CAPSULE, DELAYED RELEASE ORAL DAILY
Qty: 90 CAPSULE | Refills: 3 | Status: SHIPPED | OUTPATIENT
Start: 2024-05-07 | End: 2024-05-21 | Stop reason: SINTOL

## 2024-05-07 NOTE — TELEPHONE ENCOUNTER
Reviewed prior lab work, and imaging.  Had chest x-ray March 22, 2024 with normal results.  Has attempted Flonase and famotidine without improvement of chronic cough.    Initially believe that bronchitis was the cause of chronic cough, becomes less likely now that patient has had coughing for more than 2 months.      I recommend taking omeprazole daily instead of famotidine, and getting a pulmonary function test for further evaluation for possible asthma.    Provider E-Visit time total (minutes): 8 minutes

## 2024-05-07 NOTE — PATIENT INSTRUCTIONS
I recommend taking omeprazole daily instead of famotidine, and getting a pulmonary function test for further evaluation for possible asthma.    Thank you for choosing us for your care. I have placed an order for omeprazole so that you can start treatment.  You can stop famotidine.  View your full visit summary for details by clicking on the link below. Your pharmacist will able to address any questions you may have about the medication.  You should make an appointment with pulmonology, you can do this by calling our clinic at [phone] and we can help you.    If you're not feeling better within 5-7 days, please schedule an appointment.  You can schedule an appointment right here in [facility], or call [phone]  If the visit is for the same symptoms as your eVisit, we'll refund the cost of your eVisit if seen within seven days.

## 2024-05-14 ENCOUNTER — MYC MEDICAL ADVICE (OUTPATIENT)
Dept: FAMILY MEDICINE | Facility: CLINIC | Age: 39
End: 2024-05-14
Payer: COMMERCIAL

## 2024-05-14 NOTE — TELEPHONE ENCOUNTER
Checo,    You can definitely stop taking the medication if it is causing problems for you.  We will wait to see  the pulmonary function testing results prior to doing any other treatment.    Best regards,    Jovi Castillo MD

## 2024-05-16 RX ORDER — ALBUTEROL SULFATE 0.83 MG/ML
2.5 SOLUTION RESPIRATORY (INHALATION) ONCE
Status: COMPLETED | OUTPATIENT
Start: 2024-05-17 | End: 2024-05-17

## 2024-05-17 ENCOUNTER — HOSPITAL ENCOUNTER (OUTPATIENT)
Dept: RESPIRATORY THERAPY | Facility: CLINIC | Age: 39
Discharge: HOME | End: 2024-05-17
Attending: STUDENT IN AN ORGANIZED HEALTH CARE EDUCATION/TRAINING PROGRAM | Admitting: STUDENT IN AN ORGANIZED HEALTH CARE EDUCATION/TRAINING PROGRAM
Payer: COMMERCIAL

## 2024-05-17 DIAGNOSIS — R05.3 CHRONIC COUGH: Primary | ICD-10-CM

## 2024-05-17 LAB — HGB BLD-MCNC: 16.2 G/DL (ref 13.3–17.7)

## 2024-05-17 PROCEDURE — 94726 PLETHYSMOGRAPHY LUNG VOLUMES: CPT

## 2024-05-17 PROCEDURE — 94729 DIFFUSING CAPACITY: CPT | Mod: 26 | Performed by: INTERNAL MEDICINE

## 2024-05-17 PROCEDURE — 94060 EVALUATION OF WHEEZING: CPT

## 2024-05-17 PROCEDURE — 85018 HEMOGLOBIN: CPT | Performed by: STUDENT IN AN ORGANIZED HEALTH CARE EDUCATION/TRAINING PROGRAM

## 2024-05-17 PROCEDURE — 94729 DIFFUSING CAPACITY: CPT

## 2024-05-17 PROCEDURE — 999N000157 HC STATISTIC RCP TIME EA 10 MIN

## 2024-05-17 PROCEDURE — 94060 EVALUATION OF WHEEZING: CPT | Mod: 26 | Performed by: INTERNAL MEDICINE

## 2024-05-17 PROCEDURE — 94726 PLETHYSMOGRAPHY LUNG VOLUMES: CPT | Mod: 26 | Performed by: INTERNAL MEDICINE

## 2024-05-17 PROCEDURE — 36415 COLL VENOUS BLD VENIPUNCTURE: CPT | Performed by: STUDENT IN AN ORGANIZED HEALTH CARE EDUCATION/TRAINING PROGRAM

## 2024-05-17 PROCEDURE — 250N000009 HC RX 250: Performed by: STUDENT IN AN ORGANIZED HEALTH CARE EDUCATION/TRAINING PROGRAM

## 2024-05-17 RX ADMIN — ALBUTEROL SULFATE 2.5 MG: 2.5 SOLUTION RESPIRATORY (INHALATION) at 08:40

## 2024-05-17 NOTE — PROGRESS NOTES
RESPIRATORY CARE NOTE    Complete Pulmonary Function Test completed by patient.  Good patient effort and cooperation. Albuterol 2.5 mg neb given for bronchodilation.  The results of this test meet the ATS standards for acceptability and repeatability. PT returned to baseline and left in no distress.    Jeimy Green, RT  5/17/2024

## 2024-05-21 ENCOUNTER — MYC MEDICAL ADVICE (OUTPATIENT)
Dept: FAMILY MEDICINE | Facility: CLINIC | Age: 39
End: 2024-05-21
Payer: COMMERCIAL

## 2024-05-21 DIAGNOSIS — K21.9 GASTROESOPHAGEAL REFLUX DISEASE WITHOUT ESOPHAGITIS: ICD-10-CM

## 2024-05-21 DIAGNOSIS — J34.89 NASAL OBSTRUCTION: Primary | ICD-10-CM

## 2024-05-21 LAB
DLCOCOR-%PRED-PRE: 107 %
DLCOCOR-PRE: 34.75 ML/MIN/MMHG
DLCOUNC-%PRED-PRE: 112 %
DLCOUNC-PRE: 36.23 ML/MIN/MMHG
DLCOUNC-PRED: 32.3 ML/MIN/MMHG
ERV-%PRED-PRE: 70 %
ERV-PRE: 1.28 L
ERV-PRED: 1.81 L
EXPTIME-PRE: 6.82 SEC
FEF2575-%PRED-POST: 95 %
FEF2575-%PRED-PRE: 88 %
FEF2575-POST: 3.85 L/SEC
FEF2575-PRE: 3.58 L/SEC
FEF2575-PRED: 4.05 L/SEC
FEFMAX-%PRED-PRE: 82 %
FEFMAX-PRE: 8.55 L/SEC
FEFMAX-PRED: 10.42 L/SEC
FEV1-%PRED-PRE: 90 %
FEV1-PRE: 3.69 L
FEV1FEV6-PRE: 81 %
FEV1FEV6-PRED: 82 %
FEV1FVC-PRE: 81 %
FEV1FVC-PRED: 82 %
FEV1SVC-PRE: 93 %
FEV1SVC-PRED: 79 %
FIFMAX-PRE: 3.6 L/SEC
FRCPLETH-%PRED-PRE: 94 %
FRCPLETH-PRE: 3.29 L
FRCPLETH-PRED: 3.48 L
FVC-%PRED-PRE: 90 %
FVC-PRE: 4.56 L
FVC-PRED: 5.03 L
IC-%PRED-PRE: 73 %
IC-PRE: 2.68 L
IC-PRED: 3.62 L
RVPLETH-%PRED-PRE: 101 %
RVPLETH-PRE: 2.01 L
RVPLETH-PRED: 1.99 L
TLCPLETH-%PRED-PRE: 81 %
TLCPLETH-PRE: 5.97 L
TLCPLETH-PRED: 7.33 L
VA-%PRED-PRE: 94 %
VA-PRE: 6.55 L
VC-%PRED-PRE: 76 %
VC-PRE: 3.96 L
VC-PRED: 5.18 L

## 2024-05-21 NOTE — TELEPHONE ENCOUNTER
Sent a message to the patient asking if you would like to attempt esophageal pH monitoring to prove GERD, as I do believe that is the most likely cause of his coughing, versus attempting a different PPI as he was not able to tolerate omeprazole.  Would recommend esomeprazole instead.

## 2024-05-23 NOTE — TELEPHONE ENCOUNTER
Wrote patient message, plan I propose is to attempt PPI, and if not effective, will do pH monitoring.    If GERD is confirmed by response to PPI vs pH monitoring, would recommend EGD.

## 2024-06-02 ENCOUNTER — NURSE TRIAGE (OUTPATIENT)
Dept: NURSING | Facility: CLINIC | Age: 39
End: 2024-06-02
Payer: COMMERCIAL

## 2024-06-02 ENCOUNTER — OFFICE VISIT (OUTPATIENT)
Dept: FAMILY MEDICINE | Facility: CLINIC | Age: 39
End: 2024-06-02
Payer: COMMERCIAL

## 2024-06-02 VITALS
SYSTOLIC BLOOD PRESSURE: 123 MMHG | DIASTOLIC BLOOD PRESSURE: 79 MMHG | RESPIRATION RATE: 16 BRPM | BODY MASS INDEX: 27.16 KG/M2 | WEIGHT: 192.9 LBS | OXYGEN SATURATION: 100 % | HEART RATE: 77 BPM | TEMPERATURE: 98.3 F

## 2024-06-02 DIAGNOSIS — R23.3 PETECHIAL RASH: Primary | ICD-10-CM

## 2024-06-02 DIAGNOSIS — J32.9 CHRONIC SINUSITIS, UNSPECIFIED LOCATION: ICD-10-CM

## 2024-06-02 LAB
ANION GAP SERPL CALCULATED.3IONS-SCNC: 10 MMOL/L (ref 7–15)
BASOPHILS # BLD AUTO: 0 10E3/UL (ref 0–0.2)
BASOPHILS NFR BLD AUTO: 1 %
BUN SERPL-MCNC: 10.9 MG/DL (ref 6–20)
CALCIUM SERPL-MCNC: 9.3 MG/DL (ref 8.6–10)
CHLORIDE SERPL-SCNC: 103 MMOL/L (ref 98–107)
CREAT SERPL-MCNC: 0.98 MG/DL (ref 0.67–1.17)
CRP SERPL-MCNC: <3 MG/L
DEPRECATED HCO3 PLAS-SCNC: 28 MMOL/L (ref 22–29)
EGFRCR SERPLBLD CKD-EPI 2021: >90 ML/MIN/1.73M2
EOSINOPHIL # BLD AUTO: 0.3 10E3/UL (ref 0–0.7)
EOSINOPHIL NFR BLD AUTO: 4 %
ERYTHROCYTE [DISTWIDTH] IN BLOOD BY AUTOMATED COUNT: 12.2 % (ref 10–15)
GLUCOSE SERPL-MCNC: 120 MG/DL (ref 70–99)
HCT VFR BLD AUTO: 47 % (ref 40–53)
HGB BLD-MCNC: 15.9 G/DL (ref 13.3–17.7)
HOLD SPECIMEN: NORMAL
IMM GRANULOCYTES # BLD: 0 10E3/UL
IMM GRANULOCYTES NFR BLD: 0 %
LYMPHOCYTES # BLD AUTO: 1.6 10E3/UL (ref 0.8–5.3)
LYMPHOCYTES NFR BLD AUTO: 26 %
MCH RBC QN AUTO: 29.6 PG (ref 26.5–33)
MCHC RBC AUTO-ENTMCNC: 33.8 G/DL (ref 31.5–36.5)
MCV RBC AUTO: 87 FL (ref 78–100)
MONOCYTES # BLD AUTO: 0.4 10E3/UL (ref 0–1.3)
MONOCYTES NFR BLD AUTO: 6 %
NEUTROPHILS # BLD AUTO: 3.7 10E3/UL (ref 1.6–8.3)
NEUTROPHILS NFR BLD AUTO: 63 %
PLATELET # BLD AUTO: 245 10E3/UL (ref 150–450)
POTASSIUM SERPL-SCNC: 4 MMOL/L (ref 3.4–5.3)
RBC # BLD AUTO: 5.38 10E6/UL (ref 4.4–5.9)
SODIUM SERPL-SCNC: 141 MMOL/L (ref 135–145)
WBC # BLD AUTO: 5.9 10E3/UL (ref 4–11)

## 2024-06-02 PROCEDURE — 86140 C-REACTIVE PROTEIN: CPT | Performed by: FAMILY MEDICINE

## 2024-06-02 PROCEDURE — 36415 COLL VENOUS BLD VENIPUNCTURE: CPT | Performed by: FAMILY MEDICINE

## 2024-06-02 PROCEDURE — 80048 BASIC METABOLIC PNL TOTAL CA: CPT | Performed by: FAMILY MEDICINE

## 2024-06-02 PROCEDURE — 85025 COMPLETE CBC W/AUTO DIFF WBC: CPT | Performed by: FAMILY MEDICINE

## 2024-06-02 PROCEDURE — 99214 OFFICE O/P EST MOD 30 MIN: CPT | Performed by: FAMILY MEDICINE

## 2024-06-02 NOTE — TELEPHONE ENCOUNTER
Pt is phoning stating that he has been taking Nexium for about a week and states he now has a rash on his back     Pt just noticed the rash on his back this morning 06/02/2024    Rash is round described as petechiae spots     Rash on back is not painful or itching     Pt cannot be sure if the rash is from Nexium medication or something else     Pt had already taken Nexium this am prior to calling nurse line     No fever     No breathing difficulty     No swallowing difficulty     Per disposition: See HCP Within 4 Hours (Or PCP Triage)     Pt will be going to [facility] now for evaluation     Care advice given per protocol and when to call back. Pt verbalized understanding and agrees to plan of care.    Ana Bowles RN  [place] Nurse Advisor  9:20 AM 6/2/2024            Reason for Disposition   [1] Localized purple or blood-colored spots or dots AND [2] not from injury or friction AND [3] no fever    Additional Information   Negative: [1] Sudden onset of rash (within last 2 hours) AND [2] difficulty breathing or swallowing   Negative: Sounds like a life-threatening emergency to the triager   Negative: Athlete's Foot suspected (i.e., itchy rash between the toes)   Negative: Impetigo suspected (i.e., painless infected superficial small sores, less than 1 inch or 2.5 cm, often covered by a soft, yellow-brown scab or crust; sometimes occurring near nasal openings)   Negative: Insect bite(s) suspected   Negative: Jock Itch suspected (i.e., itchy rash on inner thighs near genital area)   Negative: Localized lump (or swelling) without redness or rash   Negative: [1] Mpox suspected (e.g., direct skin contact such as sex, recent travel to [place] or [place]) AND [2] symptoms of Mpox (e.g., rash, fever, muscle aches, or swollen lymph nodes)   Negative: [1] At risk for Mpox (men-who-have-sex-with-men) AND [2] possible exposure (e.g., multiple sex partners in past 21 days) AND [3] symptoms of Mpox (e.g., rash, fever, muscle  aches, or swollen lymph nodes)   Negative: Poison ivy, oak, or sumac rash suspected (e.g., itchy rash after contact with poison ivy)   Negative: Rash of female genital area  (e.g., labia, vagina, vulva)   Negative: Rash of male genital area (e.g., penis, scrotum)   Negative: Redness of immunization site   Negative: Ringworm suspected (i.e., round pink patch, sometimes looks like ring, usually 1/2 to 1 inch [12-25 mm],  in size, slowly increasing in size)   Negative: Shingles suspected (i.e., painful rash, multiple small blisters grouped together in one area of body; dermatomal distribution)   Negative: Small spot, skin growth, or mole   Negative: Sores or skin ulcer, not a rash   Negative: Wound infection suspected (i.e., pain, spreading redness, or pus; in a cut, puncture, scrape or sutured wound)   Negative: [1] Localized purple or blood-colored spots or dots AND [2] not from injury or friction AND [3] fever   Negative: Patient sounds very sick or weak to the triager   Negative: [1] Red area or streak AND [2] fever   Negative: [1] Rash is painful to touch AND [2] fever   Negative: [1] Looks infected (spreading redness, pus) AND [2] large red area (> 2 in. or 5 cm)   Negative: [1] Looks infected (spreading redness, pus) AND [2] diabetes mellitus or weak immune system (e.g., HIV positive, cancer chemo, splenectomy, organ transplant, chronic steroids)    Protocols used: Rash or Redness - [id number]

## 2024-06-03 NOTE — PROGRESS NOTES
Assessment/Plan:   1. Petechial rash  - CBC with platelets and differential  - Basic metabolic panel  (Ca, Cl, CO2, Creat, Gluc, K, Na, BUN)  - CRP, inflammation  2. Chronic sinusitis, unspecified location  - amoxicillin-clavulanate (AUGMENTIN) 875-125 MG tablet; Take 1 tablet by mouth 2 times daily for 10 days  Dispense: 20 tablet; Refill: 0    New rash on his back, petechial type, annular patches. May have been from scratching the back due to itch from dry skin. Currently no other signs of bleeding. Differential includes residual low platelets from recent covid infection, other vasculitis, other viral illness. Consider possible nexium drug reaction since that is the only new thing. We will hold that for now pending further evaluation.   Will check CBC, cRP and BMP.     Persistent cough for 3 months since covid infection, persistent sinus congestion and post nasal drainage. Will treat for chronic sinusitis since other measures have not been helping.      Plan:  Stop nexium for now. May use pepcid as needed like before.   Labs pending, I will call if any change in plan.   Recheck with primary care if increased bruising, bleeding, bruising, rash is spreading, fever, blood in urine, muscle pain.     Augmentin as ordered for chronic sinusitis.     I discussed red flag symptoms, return precautions to clinic/ER and follow up care with patient/parent.  Expected clinical course, symptomatic cares advised. Questions answered. Patient/parent amenable with plan.      Subjective:     Neville Neil is a 38 year old male who presents for evaluation of a rash.   He noticed a rash on his left wrist Wednesday and Thursday which resolved - reddish, not itchy.   Today his wife noticed rash on his back. He wasn't aware of it.   Not painful or really itchy.   Annular patches. No vesicles, scales, hives.   He started taking nexium 20mg daily about a week ago.   He had previously been taking pepcid twice daily as needed.   He has  had ongoing cough and clear the throat issues since he had Covid in February. He then had bronchitis, treated with inhaler. Sinus congestion and cough have persisted.   The nexium was given to him thinking the cough may be due to acid reflux.     Allergies   Allergen Reactions    Azithromycin Other (See Comments)     PN: Uncontrolled shaking with higher dose(1st day), PN: Uncontrolled shaking with higher dose(1st day)     Current Outpatient Medications   Medication Sig Dispense Refill    amoxicillin-clavulanate (AUGMENTIN) 875-125 MG tablet Take 1 tablet by mouth 2 times daily for 10 days 20 tablet 0    Ascorbic Acid (VITAMIN C) 500 MG CAPS Take 1 tablet by mouth daily      esomeprazole (NEXIUM) 20 MG DR capsule Take 1 capsule (20 mg) by mouth every morning (before breakfast) Take 30-60 minutes before eating. 90 capsule 3    MULTIPLE VITAMIN PO Take 1 tablet by mouth daily      Famotidine (PEPCID PO) Take 20 tablets by mouth daily as needed for indigestion (Patient not taking: Reported on 6/2/2024)       No current facility-administered medications for this visit.     Patient Active Problem List   Diagnosis    Gastroesophageal reflux disease without esophagitis    Tongue tied    Healthcare maintenance    Nasal obstruction       Objective:     /79   Pulse 77   Temp 98.3  F (36.8  C) (Oral)   Resp 16   Wt 87.5 kg (192 lb 14.4 oz)   SpO2 100%   BMI 27.16 kg/m      Physical    General Appearance: Alert, pleasant, no distress, aVSS  Head: Normocephalic, without obvious abnormality, atraumatic  Eyes: Conjunctivae are normal.   Ears: Normal TMs and external ear canals, both ears  Nose: congestion. Swollen turbinates with yellow mucus, no acute sinus pain with percussion  Throat: Throat is normal though there is yellow mucus in back of throat.  No exudate.  No vesicular lesions  Neck: No adenopathy  Lungs: Clear to auscultation bilaterally, respirations unlabored, good air movement  Heart: Regular rate and  rhythm  Extremities: No lower extremity edema, no calf tenderness  Skin: there is a large area of separate annular patches of petechia - small red flat nonblanching dots - on the right side of mid back. Few linear as well. No vesicles, patches, urticaria. No diffuse rash or signs of increased bleeding elsewhere.   Psychiatric: Patient has a normal mood and affect.         Results for orders placed or performed in visit on 06/02/24   Extra Tube     Status: None    Narrative    The following orders were created for panel order Extra Tube.  Procedure                               Abnormality         Status                     ---------                               -----------         ------                     Extra Blue Top Tube[[id number]]                              Final result               Extra Red Top Tube[[id number]]                               Final result               Extra Green Top (Lithium...[[id number]]                      Final result                 Please view results for these tests on the individual orders.   CBC with platelets and differential     Status: None   Result Value Ref Range    WBC Count 5.9 4.0 - 11.0 10e3/uL    RBC Count 5.38 4.40 - 5.90 10e6/uL    Hemoglobin 15.9 13.3 - 17.7 g/dL    Hematocrit 47.0 40.0 - 53.0 %    MCV 87 78 - 100 fL    MCH 29.6 26.5 - 33.0 pg    MCHC 33.8 31.5 - 36.5 g/dL    RDW 12.2 10.0 - 15.0 %    Platelet Count 245 150 - 450 10e3/uL    % Neutrophils 63 %    % Lymphocytes 26 %    % Monocytes 6 %    % Eosinophils 4 %    % Basophils 1 %    % Immature Granulocytes 0 %    Absolute Neutrophils 3.7 1.6 - 8.3 10e3/uL    Absolute Lymphocytes 1.6 0.8 - 5.3 10e3/uL    Absolute Monocytes 0.4 0.0 - 1.3 10e3/uL    Absolute Eosinophils 0.3 0.0 - 0.7 10e3/uL    Absolute Basophils 0.0 0.0 - 0.2 10e3/uL    Absolute Immature Granulocytes 0.0 <=0.4 10e3/uL   Extra Blue Top Tube     Status: None   Result Value Ref Range    Hold Specimen JIC    Extra Red Top Tube     Status: None    Result Value Ref Range    Hold Specimen JI    Extra Green Top (Lithium Heparin) Tube     Status: None   Result Value Ref Range    Hold Specimen JI    Basic metabolic panel  (Ca, Cl, CO2, Creat, Gluc, K, Na, BUN)     Status: Abnormal   Result Value Ref Range    Sodium 141 135 - 145 mmol/L    Potassium 4.0 3.4 - 5.3 mmol/L    Chloride 103 98 - 107 mmol/L    Carbon Dioxide (CO2) 28 22 - 29 mmol/L    Anion Gap 10 7 - 15 mmol/L    Urea Nitrogen 10.9 6.0 - 20.0 mg/dL    Creatinine 0.98 0.67 - 1.17 mg/dL    GFR Estimate >90 >60 mL/min/1.73m2    Calcium 9.3 8.6 - 10.0 mg/dL    Glucose 120 (H) 70 - 99 mg/dL   CRP, inflammation     Status: Normal   Result Value Ref Range    CRP Inflammation <3.00 <5.00 mg/L   CBC with platelets and differential     Status: None    Narrative    The following orders were created for panel order CBC with platelets and differential.  Procedure                               Abnormality         Status                     ---------                               -----------         ------                     CBC with platelets and d...[[id number]]                      Final result                 Please view results for these tests on the individual orders.       This note has been dictated in part using voice recognition software.  Any grammatical or context distortions are unintentional and inherent to the software.  Please feel free to contact me directly for clarification if needed.

## 2024-06-03 NOTE — PATIENT INSTRUCTIONS
Stop nexium for now. May use pepcid as needed like before.     Labs pending, I will call if any change in plan.     Recheck with primary care if increased bruising, bleeding, bruising, rash is spreading, fever, blood in urine, muscle pain.     Augmentin as ordered for chronic sinusitis.       118

## 2024-11-12 ENCOUNTER — OFFICE VISIT (OUTPATIENT)
Dept: FAMILY MEDICINE | Facility: CLINIC | Age: 39
End: 2024-11-12
Payer: COMMERCIAL

## 2024-11-12 VITALS
RESPIRATION RATE: 14 BRPM | HEIGHT: 71 IN | OXYGEN SATURATION: 98 % | SYSTOLIC BLOOD PRESSURE: 120 MMHG | DIASTOLIC BLOOD PRESSURE: 74 MMHG | WEIGHT: 197.1 LBS | HEART RATE: 71 BPM | BODY MASS INDEX: 27.59 KG/M2 | TEMPERATURE: 98.2 F

## 2024-11-12 DIAGNOSIS — H61.23 BILATERAL IMPACTED CERUMEN: ICD-10-CM

## 2024-11-12 DIAGNOSIS — R05.3 CHRONIC COUGH: ICD-10-CM

## 2024-11-12 DIAGNOSIS — L91.8 ACROCHORDON: ICD-10-CM

## 2024-11-12 DIAGNOSIS — K21.9 GASTROESOPHAGEAL REFLUX DISEASE, UNSPECIFIED WHETHER ESOPHAGITIS PRESENT: Primary | ICD-10-CM

## 2024-11-12 PROCEDURE — 99213 OFFICE O/P EST LOW 20 MIN: CPT | Mod: 25 | Performed by: STUDENT IN AN ORGANIZED HEALTH CARE EDUCATION/TRAINING PROGRAM

## 2024-11-12 PROCEDURE — 69210 REMOVE IMPACTED EAR WAX UNI: CPT | Mod: 50 | Performed by: STUDENT IN AN ORGANIZED HEALTH CARE EDUCATION/TRAINING PROGRAM

## 2024-11-12 NOTE — PROGRESS NOTES
Assessment & Plan     Gastroesophageal reflux disease, unspecified whether esophagitis present  GERD is a 39-year-old male with history of chronic GERD since he was a teenager.  He is able to control this with famotidine once every 1 to 2 weeks.  We attempted PPI however he was not able to tolerate omeprazole or Nexium due to side effects although he is not able to remember what side effects he was having on the medications.    He does not have any red flag symptoms.    Due to duration of reflux do recommend that he has an EGD done as he has never had this done previously I put a referral in for this to be done.  Also recommend for pH monitoring and manometry to prove presence of GERD as this is likely the reason for his chronic cough.  He does not wish to attempt a third PPI at this time (and is very happy with famotidine treatment currently), we will await gastroenterology evaluation.      We did discuss lifestyle changes that he could make, would recommend that he stop dairy as well in the event that this is being caused by eosinophilic esophagitis, to make an attempt at improving symptoms with dietary change.    - Adult GI  Referral - Procedure Only    Chronic cough    Likely secondary to GERD, also possible upper airway syndrome as patient has had many issues with his sinuses in the past requiring multiple different surgeries without much improvement.  Previously had attempted Flonase.  In addition to attempting the above for GERD treatment, we will also have him try over-the-counter azelastine to see if there is any improvement in coughing, clearing throat.    Acrochordon  Has not acrochordon of the left upper eyelid which is bothering him, also 2 more, 1 just lateral to the left eye and one inferior to the right lower eyelid which do bother him and he would like to have them removed.  Location especially of the one of the left upper eyelid I do not feel comfortable doing it.  Recommend that he have  "referral to ophthalmology for removal he was agreeable and referral was placed.    - Adult Eye  Referral    Bilateral impacted cerumen  Reports frequently getting impacted ears with cerumen, they were both impacted on exam today.  Removed significant amount of earwax from the left and all the earwax from the right with curette.  The rest of the earwax in the left was removed with irrigation.  He may return anytime for feels like his ears are plugged or if his hearing is reduced.    - REMOVE IMPACTED CERUMEN            BMI  Estimated body mass index is 27.75 kg/m  as calculated from the following:    Height as of this encounter: 1.795 m (5' 10.67\").    Weight as of this encounter: 89.4 kg (197 lb 1.6 oz).         Follow-up as needed for ongoing chronic cough, in 1 to 2 months    Subjective   Checo is a 39 year old, presenting for the following health issues:  Gastrophageal Reflux (Medications that prescribed did not seem to help), Skin Tags (On eyelids, would like to know if it can be removed or if he needs referral.), and Cerumen Impaction (Ear examination for ear wax. Reports not being able to hear well in both ears.)        11/12/2024     7:48 AM   Additional Questions   Roomed by JEREMIAH HAYDEN     History of Present Illness       Reason for visit:  Gerd,skin tags, earwax removal    He eats 2-3 servings of fruits and vegetables daily.He consumes 1 sweetened beverage(s) daily.He exercises with enough effort to increase his heart rate 9 or less minutes per day.  He exercises with enough effort to increase his heart rate 3 or less days per week.   He is taking medications regularly.       Reports that he has been using pepcid as needed, about once every two weeks.  Was not able to tolerate PPI treatment with omeprazole and nexium due to side effects though can't remember what the side effects were, continues to have coughing daily after drinking caffeine or eating.  Coughing is irritating but otherwise not " "bothersome. Reports GERD has been ongoing since he was a teenager.     Denies odynophagia, but once every couple years will have issues with lump in throat. Denies dysphagia.     Continues to have a chronic cough, reports that he has to clear his throat frequently.  Coughing almost always occurs after drinking caffeine or eating a larger meal.  Notes that he has attempted Flonase in the past and has had multiple sinus surgeries without improvement.          Review of Systems  Constitutional, neuro, ENT, endocrine, pulmonary, cardiac, gastrointestinal, genitourinary, musculoskeletal, integument and psychiatric systems are negative, except as otherwise noted.      Objective    /74 (BP Location: Right arm, Patient Position: Sitting, Cuff Size: Adult Regular)   Pulse 71   Temp 98.2  F (36.8  C) (Oral)   Resp 14   Ht 1.795 m (5' 10.67\")   Wt 89.4 kg (197 lb 1.6 oz)   SpO2 98%   BMI 27.75 kg/m    Body mass index is 27.75 kg/m .  Physical Exam   GENERAL: alert and no distress  HENT: normal cephalic/atraumatic, both ears: occluded with wax, nose and mouth without ulcers or lesions, oropharynx clear, and oral mucous membranes moist  CV: regular rate and rhythm, normal S1 S2, no S3 or S4, no murmur, click or rub, no peripheral edema  MS: no gross musculoskeletal defects noted, no edema  SKIN: Has 3 pedunculated flesh-colored papules, 1 on the left upper eyelid, 1 lateral to the left eye and one inferior to the right lower eyelid that are all 1 mm or less in diameter.  No suspicious lesions or rashes    Office Visit on 06/02/2024   Component Date Value Ref Range Status    WBC Count 06/02/2024 5.9  4.0 - 11.0 10e3/uL Final    RBC Count 06/02/2024 5.38  4.40 - 5.90 10e6/uL Final    Hemoglobin 06/02/2024 15.9  13.3 - 17.7 g/dL Final    Hematocrit 06/02/2024 47.0  40.0 - 53.0 % Final    MCV 06/02/2024 87  78 - 100 fL Final    MCH 06/02/2024 29.6  26.5 - 33.0 pg Final    MCHC 06/02/2024 33.8  31.5 - 36.5 g/dL Final    " RDW 06/02/2024 12.2  10.0 - 15.0 % Final    Platelet Count 06/02/2024 245  150 - 450 10e3/uL Final    % Neutrophils 06/02/2024 63  % Final    % Lymphocytes 06/02/2024 26  % Final    % Monocytes 06/02/2024 6  % Final    % Eosinophils 06/02/2024 4  % Final    % Basophils 06/02/2024 1  % Final    % Immature Granulocytes 06/02/2024 0  % Final    Absolute Neutrophils 06/02/2024 3.7  1.6 - 8.3 10e3/uL Final    Absolute Lymphocytes 06/02/2024 1.6  0.8 - 5.3 10e3/uL Final    Absolute Monocytes 06/02/2024 0.4  0.0 - 1.3 10e3/uL Final    Absolute Eosinophils 06/02/2024 0.3  0.0 - 0.7 10e3/uL Final    Absolute Basophils 06/02/2024 0.0  0.0 - 0.2 10e3/uL Final    Absolute Immature Granulocytes 06/02/2024 0.0  <=0.4 10e3/uL Final    Hold Specimen 06/02/2024 JIC   Final    Hold Specimen 06/02/2024 JI   Final    Hold Specimen 06/02/2024 [facility]   Final    Sodium 06/02/2024 141  135 - 145 mmol/L Final    Reference intervals for this test were updated on 09/26/2023 to more accurately reflect our healthy population. There may be differences in the flagging of prior results with similar values performed with this method. Interpretation of those prior results can be made in the context of the updated reference intervals.     Potassium 06/02/2024 4.0  3.4 - 5.3 mmol/L Final    Chloride 06/02/2024 103  98 - 107 mmol/L Final    Carbon Dioxide (CO2) 06/02/2024 28  22 - 29 mmol/L Final    Anion Gap 06/02/2024 10  7 - 15 mmol/L Final    Urea Nitrogen 06/02/2024 10.9  6.0 - 20.0 mg/dL Final    Creatinine 06/02/2024 0.98  0.67 - 1.17 mg/dL Final    GFR Estimate 06/02/2024 >90  >60 mL/min/1.73m2 Final    Calcium 06/02/2024 9.3  8.6 - 10.0 mg/dL Final    Glucose 06/02/2024 120 (H)  70 - 99 mg/dL Final    CRP Inflammation 06/02/2024 <3.00  <5.00 mg/L Final       The longitudinal plan of care for the diagnosis(es)/condition(s) as documented were addressed during this visit. Due to the added complexity in care, I will continue to support Checo in the  subsequent management and with ongoing continuity of care.      Signed Electronically by: Jovi Castillo MD

## 2024-12-26 ENCOUNTER — TRANSFERRED RECORDS (OUTPATIENT)
Dept: HEALTH INFORMATION MANAGEMENT | Facility: CLINIC | Age: 39
End: 2024-12-26
Payer: COMMERCIAL

## 2025-01-02 DIAGNOSIS — K21.00 GASTROESOPHAGEAL REFLUX DISEASE WITH ESOPHAGITIS WITHOUT HEMORRHAGE: Primary | ICD-10-CM

## 2025-01-02 DIAGNOSIS — K29.40 ATROPHIC GASTRITIS WITHOUT HEMORRHAGE: ICD-10-CM

## 2025-01-02 DIAGNOSIS — K29.80 DUODENITIS: ICD-10-CM

## 2025-01-02 RX ORDER — PANTOPRAZOLE SODIUM 20 MG/1
20 TABLET, DELAYED RELEASE ORAL DAILY
Qty: 30 TABLET | Refills: 11 | Status: SHIPPED | OUTPATIENT
Start: 2025-01-02

## 2025-02-05 ENCOUNTER — OFFICE VISIT (OUTPATIENT)
Dept: FAMILY MEDICINE | Facility: CLINIC | Age: 40
End: 2025-02-05
Payer: COMMERCIAL

## 2025-02-05 VITALS
SYSTOLIC BLOOD PRESSURE: 108 MMHG | TEMPERATURE: 99.3 F | OXYGEN SATURATION: 99 % | DIASTOLIC BLOOD PRESSURE: 74 MMHG | WEIGHT: 189 LBS | HEIGHT: 71 IN | BODY MASS INDEX: 26.46 KG/M2 | RESPIRATION RATE: 14 BRPM | HEART RATE: 98 BPM

## 2025-02-05 DIAGNOSIS — J32.9 SINUSITIS, UNSPECIFIED CHRONICITY, UNSPECIFIED LOCATION: Primary | ICD-10-CM

## 2025-02-05 PROCEDURE — 99213 OFFICE O/P EST LOW 20 MIN: CPT | Performed by: FAMILY MEDICINE

## 2025-02-05 NOTE — PROGRESS NOTES
"  Assessment & Plan     (J32.9) Sinusitis, unspecified chronicity, unspecified location  (primary encounter diagnosis)  Comment: Clinical history consistent with a sinus infection  Plan: amoxicillin-clavulanate (AUGMENTIN) 875-125 MG         tablet             PLAN:  1.  Empiric treatment with Augmentin 875 mg twice daily x 7-day  2.  Also recommend over-the-counter Sudafed and follow-up as needed          BMI  Estimated body mass index is 26.74 kg/m  as calculated from the following:    Height as of this encounter: 1.791 m (5' 10.5\").    Weight as of this encounter: 85.7 kg (189 lb).             Deborah Kessler is a 39 year old, presenting for the following health issues:  Sinus Problem (Started 3x week) and Ear Problem      2/5/2025     9:47 AM   Additional Questions   Roomed by Patrick     Sinus Problem   Associated symptoms include ear pain.   Ear Problem    History of Present Illness       Reason for visit:  I think i may have sinusitis and ear pain  Symptom onset:  3-4 weeks ago  Symptoms include:  Green mucus, loss of taste and smell, plugged ear with some ear pain  Symptom intensity:  Moderate  Symptom progression:  Staying the same  Had these symptoms before:  Yes  Has tried/received treatment for these symptoms:  Yes  Previous treatment was successful:  Yes  Prior treatment description:  Augmentin antibiotic  What makes it worse:  Sitting up or standing  What makes it better:  Laying down   He is taking medications regularly.     Patient comes in for several weeks of what he thinks might be a sinus infection which she has had before, does feel congested and he feels like his left ear is plugged up some discomfort more in the forehead area, he is not really coughing at this time does not think he is running a temperature or fever.    Of note his 1-year-old son is actually hospitalized now for RSV, it is possible the patient got sick with RSV and this turned into a sinus infection, in either case I told the " "patient I do think he has a sinus infection, he has not done well with Zithromax I would use Augmentin but I also recommend Sudafed as a decongestant                        Objective    /74   Pulse 98   Temp 99.3  F (37.4  C) (Oral)   Resp 14   Ht 1.791 m (5' 10.5\")   Wt 85.7 kg (189 lb)   SpO2 99%   BMI 26.74 kg/m    Body mass index is 26.74 kg/m .  Physical Exam   GENERAL: alert and no distress  EYES: Eyes grossly normal to inspection, PERRL and conjunctivae and sclerae normal  HENT: normal cephalic/atraumatic, ear canals and TM's normal, nose and mouth without ulcers or lesions, oropharynx clear, oral mucous membranes moist, and bilateral nasal swelling and congestion  NECK: no adenopathy, no asymmetry, masses, or scars  RESP: lungs clear to auscultation - no rales, rhonchi or wheezes  CV: regular rate and rhythm, normal S1 S2, no S3 or S4, no murmur, click or rub, no peripheral edema  MS: no gross musculoskeletal defects noted, no edema            Signed Electronically by: Prashant Coronado MD    "

## 2025-04-09 DIAGNOSIS — K29.80 LYMPHOCYTIC DUODENITIS: Primary | ICD-10-CM
